# Patient Record
Sex: MALE | Race: WHITE | NOT HISPANIC OR LATINO | Employment: FULL TIME | URBAN - METROPOLITAN AREA
[De-identification: names, ages, dates, MRNs, and addresses within clinical notes are randomized per-mention and may not be internally consistent; named-entity substitution may affect disease eponyms.]

---

## 2017-02-01 ENCOUNTER — TELEPHONE (OUTPATIENT)
Dept: GASTROENTEROLOGY | Facility: CLINIC | Age: 77
End: 2017-02-01

## 2017-02-01 NOTE — TELEPHONE ENCOUNTER
----- Message from Keiry Gardner sent at 2/1/2017  9:16 AM CST -----  Contact: self, [phone]  Patient called in requesting to speak with you regarding his test results. Please advise.

## 2017-04-04 ENCOUNTER — TELEPHONE (OUTPATIENT)
Dept: GASTROENTEROLOGY | Facility: CLINIC | Age: 77
End: 2017-04-04

## 2017-04-04 NOTE — TELEPHONE ENCOUNTER
----- Message from Sally Jameson sent at 4/4/2017  2:31 PM CDT -----  Contact: self  Pt would like to speak with the nurse regarding getting results from his biopsy.  He can be reached at [phone]

## 2017-05-31 RX ORDER — MESALAMINE 375 MG/1
CAPSULE, EXTENDED RELEASE ORAL
Qty: 120 CAPSULE | Refills: 3 | Status: SHIPPED | OUTPATIENT
Start: 2017-05-31 | End: 2017-09-13 | Stop reason: SDUPTHER

## 2017-09-04 DIAGNOSIS — K51.20 ULCERATIVE PROCTITIS WITHOUT COMPLICATION: ICD-10-CM

## 2017-09-06 RX ORDER — AZATHIOPRINE 50 MG/1
TABLET ORAL
Qty: 180 TABLET | Refills: 5 | Status: SHIPPED | OUTPATIENT
Start: 2017-09-06

## 2017-09-14 ENCOUNTER — TELEPHONE (OUTPATIENT)
Dept: GASTROENTEROLOGY | Facility: CLINIC | Age: 77
End: 2017-09-14

## 2017-09-14 RX ORDER — MESALAMINE 375 MG/1
CAPSULE, EXTENDED RELEASE ORAL
Qty: 120 CAPSULE | Refills: 3 | Status: SHIPPED | OUTPATIENT
Start: 2017-09-14

## 2017-09-14 NOTE — TELEPHONE ENCOUNTER
Refilled prescription.  Looks like Dr. Mendoza wanted him to FU at 6 months.      Please call to schedule appt with Dr. Mendoza for follow up

## 2021-01-10 ENCOUNTER — IMMUNIZATION (OUTPATIENT)
Dept: PRIMARY CARE CLINIC | Facility: CLINIC | Age: 81
End: 2021-01-10
Payer: MEDICARE

## 2021-01-10 DIAGNOSIS — Z23 NEED FOR VACCINATION: ICD-10-CM

## 2021-01-10 PROCEDURE — 91300 COVID-19, MRNA, LNP-S, PF, 30 MCG/0.3 ML DOSE VACCINE: CPT | Mod: PBBFAC | Performed by: FAMILY MEDICINE

## 2021-01-31 ENCOUNTER — IMMUNIZATION (OUTPATIENT)
Dept: PRIMARY CARE CLINIC | Facility: CLINIC | Age: 81
End: 2021-01-31
Payer: MEDICARE

## 2021-01-31 DIAGNOSIS — Z23 NEED FOR VACCINATION: Primary | ICD-10-CM

## 2021-01-31 PROCEDURE — 0002A COVID-19, MRNA, LNP-S, PF, 30 MCG/0.3 ML DOSE VACCINE: CPT | Mod: PBBFAC | Performed by: EMERGENCY MEDICINE

## 2021-01-31 PROCEDURE — 91300 COVID-19, MRNA, LNP-S, PF, 30 MCG/0.3 ML DOSE VACCINE: CPT | Mod: PBBFAC | Performed by: EMERGENCY MEDICINE

## 2021-05-14 ENCOUNTER — EXTERNAL HOSPITAL ADMISSION (OUTPATIENT)
Dept: ADMINISTRATIVE | Facility: CLINIC | Age: 81
End: 2021-05-14

## 2021-05-14 ENCOUNTER — PATIENT OUTREACH (OUTPATIENT)
Dept: ADMINISTRATIVE | Facility: CLINIC | Age: 81
End: 2021-05-14

## 2021-07-08 ENCOUNTER — PES CALL (OUTPATIENT)
Dept: ADMINISTRATIVE | Facility: CLINIC | Age: 81
End: 2021-07-08

## 2022-01-18 ENCOUNTER — TELEPHONE (OUTPATIENT)
Dept: DERMATOLOGY | Facility: CLINIC | Age: 82
End: 2022-01-18
Payer: MEDICARE

## 2022-01-18 NOTE — TELEPHONE ENCOUNTER
Spoke with patient and he stated he had a bx done by Dr. Chaudhry and would like to come in for consultation with Dr. Mcbride. I advised him to have his path faxed over to us and we can get him scheduled. Pt expressed verbal understanding and appreciated the call back.

## 2022-01-26 ENCOUNTER — TELEPHONE (OUTPATIENT)
Dept: DERMATOLOGY | Facility: CLINIC | Age: 82
End: 2022-01-26
Payer: MEDICARE

## 2022-01-26 NOTE — TELEPHONE ENCOUNTER
Contacted Mr. Hernandez he is ep and would like same day sx. Rec'd path report from [facility] but was unclear of the area that were treated previously. Contacted [facility] to rec' clarification, nurse stated that she would call back to verify the sites that need Mohs. Scheduled Mr. Hernandez  for SCC l posterior scalp confirmed day 3/9/2022. Will mail appt reminder.

## 2022-02-02 ENCOUNTER — TELEPHONE (OUTPATIENT)
Dept: DERMATOLOGY | Facility: CLINIC | Age: 82
End: 2022-02-02
Payer: MEDICARE

## 2022-02-02 NOTE — TELEPHONE ENCOUNTER
----- Message from Darin Brady sent at 2/2/2022  3:21 PM CST -----  Contact: @[phone]  Patient requesting a return call regarding 3-9th appt, Please return call to discuss further

## 2022-02-02 NOTE — TELEPHONE ENCOUNTER
Spoke with patient and answered all of his questions regarding his mohs procedure scheduled for 3/9. Informed him we did receive the other biopsy on his ear and Dr. Mcbride will decide whether or not she can do both sites same day. Pt expressed verbal understanding.

## 2022-02-12 ENCOUNTER — OFFICE VISIT (OUTPATIENT)
Dept: URGENT CARE | Facility: CLINIC | Age: 82
End: 2022-02-12
Payer: COMMERCIAL

## 2022-02-12 VITALS
HEART RATE: 70 BPM | RESPIRATION RATE: 16 BRPM | OXYGEN SATURATION: 98 % | SYSTOLIC BLOOD PRESSURE: 148 MMHG | WEIGHT: 133 LBS | BODY MASS INDEX: 20.88 KG/M2 | DIASTOLIC BLOOD PRESSURE: 83 MMHG | TEMPERATURE: 98 F | HEIGHT: 67 IN

## 2022-02-12 DIAGNOSIS — L30.9 DERMATITIS: Primary | ICD-10-CM

## 2022-02-12 PROCEDURE — 99214 OFFICE O/P EST MOD 30 MIN: CPT | Mod: S$GLB,,, | Performed by: EMERGENCY MEDICINE

## 2022-02-12 PROCEDURE — 3079F PR MOST RECENT DIASTOLIC BLOOD PRESSURE 80-89 MM HG: ICD-10-PCS | Mod: CPTII,S$GLB,, | Performed by: EMERGENCY MEDICINE

## 2022-02-12 PROCEDURE — 3077F SYST BP >= 140 MM HG: CPT | Mod: CPTII,S$GLB,, | Performed by: EMERGENCY MEDICINE

## 2022-02-12 PROCEDURE — 1159F PR MEDICATION LIST DOCUMENTED IN MEDICAL RECORD: ICD-10-PCS | Mod: CPTII,S$GLB,, | Performed by: EMERGENCY MEDICINE

## 2022-02-12 PROCEDURE — 3079F DIAST BP 80-89 MM HG: CPT | Mod: CPTII,S$GLB,, | Performed by: EMERGENCY MEDICINE

## 2022-02-12 PROCEDURE — 99214 PR OFFICE/OUTPT VISIT, EST, LEVL IV, 30-39 MIN: ICD-10-PCS | Mod: S$GLB,,, | Performed by: EMERGENCY MEDICINE

## 2022-02-12 PROCEDURE — 1159F MED LIST DOCD IN RCRD: CPT | Mod: CPTII,S$GLB,, | Performed by: EMERGENCY MEDICINE

## 2022-02-12 PROCEDURE — 1160F PR REVIEW ALL MEDS BY PRESCRIBER/CLIN PHARMACIST DOCUMENTED: ICD-10-PCS | Mod: CPTII,S$GLB,, | Performed by: EMERGENCY MEDICINE

## 2022-02-12 PROCEDURE — 1160F RVW MEDS BY RX/DR IN RCRD: CPT | Mod: CPTII,S$GLB,, | Performed by: EMERGENCY MEDICINE

## 2022-02-12 PROCEDURE — 3077F PR MOST RECENT SYSTOLIC BLOOD PRESSURE >= 140 MM HG: ICD-10-PCS | Mod: CPTII,S$GLB,, | Performed by: EMERGENCY MEDICINE

## 2022-02-12 RX ORDER — MUPIROCIN 20 MG/G
OINTMENT TOPICAL
Qty: 22 G | Refills: 1 | Status: SHIPPED | OUTPATIENT
Start: 2022-02-12

## 2022-02-12 NOTE — PATIENT INSTRUCTIONS
Patient Education    Dr. Puja Blackmon  Address: [address], [place], [place] [address]  Phone: [phone]    Dr. Radha Mclean  Address: [address], [place] [address]  Phone: [phone]       Dermatitis   The Basics   Written by the doctors and editors at [facility]   What is dermatitis? -- Dermatitis is a type of skin rash that can happen after your skin touches something that irritates it or something you are allergic to.  Things that irritate the skin can be found in products you use every day, such as soaps or cleansers. Some of the things that can cause skin allergies include:  · Certain medicines, perfumes, or cosmetics  · The metal in some kinds of jewelry  · Plants, such as poison ivy and poison oak  Sometimes you can develop a rash the first time you touch something. But it is also possible to get a rash from something you have used before without any problems.  What other symptoms should I watch for? -- If you have a rash, your skin might be dry, itchy, or cracked. In people with light skin, the rash is often red. In people with darker skin, it might appear purple, brown, gray, or black. If your rash is caused by an allergy, you might also have some swelling or blisters where you have the rash.   Severe symptoms include:  · Pain  · Widespread swelling  · Blisters, oozing, or crusting of the skin  What can I do to get rid of my dermatitis? -- You can:  · Avoid using or touching whatever might have caused your rash  · Protect your skin from anything that might irritate it or cause an allergy. For example, wear gloves if you need to work with harsh soaps.  · Try using soothing skin products to help with the itching and discomfort. Things that might help include:  ? Unscented, thick moisturizing cream or petroleum jelly  ? A special kind of bath called an oatmeal bath  Should I see a doctor or nurse? -- See your doctor or nurse if your rash does not go away within 2  weeks, or if it gets worse. Your doctor can help figure out what could be causing your rash.  How are skin rashes treated? -- Your doctor might prescribe different treatments or medicines to help your rash. These can include:  · Steroid creams and ointments - These are not the same as the steroids some athletes take illegally. They go on the skin, and they relieve itching and redness.  · Steroid pills - You might need to take these for a short time if your rash is severe. But your doctor or nurse will want to take you off steroid pills as soon as possible. Even though these medicines help, they can also cause problems of their own.  · Wet or damp dressings - These can be helpful for skin that is crusting or oozing. To use a wet or damp dressing, you will need to wear 2 layers of clothing. First, you put on a layer of damp cotton clothes over your rash. Then, you put on a layer of dry clothes on top of the damp ones. People who need these dressings often wear them at night when they sleep.  All topics are updated as new evidence becomes available and our peer review process is complete.  This topic retrieved from [employer] on: Sep 21, 2021.  Topic 59371 Version 8.0  Release: 29.4.2 - C29.263  © 2021 UpToDate, Inc. and/or its affiliates. All rights reserved.  picture 1: Irritant contact dermatitis     Irritant contact dermatitis usually affects the hands. It causes the skin to turn red and dry, and to chap and crack.  Graphic 11184 Version 4.0    Consumer Information Use and Disclaimer   This information is not specific medical advice and does not replace information you receive from your health care provider. This is only a brief summary of general information. It does NOT include all information about conditions, illnesses, injuries, tests, procedures, treatments, therapies, discharge instructions or life-style choices that may apply to you. You must talk with your health care provider for complete information about  your health and treatment options. This information should not be used to decide whether or not to accept your health care provider's advice, instructions or recommendations. Only your health care provider has the knowledge and training to provide advice that is right for you. The use of this information is governed by the [employer] End User License Agreement, available at https://www.Scalent Systems/en/solutions/Bountii/about/aida.The use of [employer] content is governed by the [employer] Terms of Use. ©2021 [employer]. All rights reserved.  Copyright   © 2021 [employer], Inc. and/or its affiliates. All rights reserved.
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
## 2022-02-14 ENCOUNTER — TELEPHONE (OUTPATIENT)
Dept: DERMATOLOGY | Facility: CLINIC | Age: 82
End: 2022-02-14
Payer: MEDICARE

## 2022-02-14 NOTE — TELEPHONE ENCOUNTER
Spoke with pt, pt states he was able to get an appt with another provider and does not need an appt with us anymore.

## 2022-02-14 NOTE — TELEPHONE ENCOUNTER
----- Message from April NEGRO Delgado sent at 2/14/2022  4:02 PM CST -----  Regarding: FW: appt  Contact: Pt    ----- Message -----  From: Uyen Antonio, Patient Care Assistant  Sent: 2/14/2022   8:38 AM CST  To: Lorri CRUZ Staff  Subject: appt                                             Pt is requesting a call back in regards to getting an appt for today. Pt states referring physician wants pt to be seen today if any availability. Pt has referral and would like to see this physician.        Pt @ [phone]

## 2022-03-09 ENCOUNTER — PROCEDURE VISIT (OUTPATIENT)
Dept: DERMATOLOGY | Facility: CLINIC | Age: 82
End: 2022-03-09
Payer: COMMERCIAL

## 2022-03-09 VITALS
HEIGHT: 67 IN | WEIGHT: 133 LBS | BODY MASS INDEX: 20.88 KG/M2 | HEART RATE: 65 BPM | DIASTOLIC BLOOD PRESSURE: 90 MMHG | SYSTOLIC BLOOD PRESSURE: 159 MMHG

## 2022-03-09 DIAGNOSIS — C44.42 SQUAMOUS CELL CARCINOMA, SCALP/NECK: Primary | ICD-10-CM

## 2022-03-09 PROCEDURE — 99499 NO LOS: ICD-10-PCS | Mod: S$GLB,,, | Performed by: DERMATOLOGY

## 2022-03-09 PROCEDURE — 13121 CMPLX RPR S/A/L 2.6-7.5 CM: CPT | Mod: S$GLB,,, | Performed by: DERMATOLOGY

## 2022-03-09 PROCEDURE — 99499 UNLISTED E&M SERVICE: CPT | Mod: S$GLB,,, | Performed by: DERMATOLOGY

## 2022-03-09 PROCEDURE — 13121 PR RECMPL WND SCALP,EXTR 2.6-7.5 CM: ICD-10-PCS | Mod: S$GLB,,, | Performed by: DERMATOLOGY

## 2022-03-09 PROCEDURE — 17311 MOHS 1 STAGE H/N/HF/G: CPT | Mod: 51,S$GLB,, | Performed by: DERMATOLOGY

## 2022-03-09 PROCEDURE — 17311: ICD-10-PCS | Mod: 51,S$GLB,, | Performed by: DERMATOLOGY

## 2022-03-09 NOTE — LETTER
March 9, 2022      [facility] 1st Fl  [address]  [place] [address]  Phone: [phone]  Fax: [phone]       Patient: Piter Hernandez   YOB: 1940  Date of Visit: 03/09/2022    To Whom It May Concern:      [facility] [employer] [facility] 03/09/2022. He may return to work on 3/14/2022 without restrictions. If you have any questions or concerns, or if I can be of further assistance, please do not hesitate to contact me.    Sincerely,        [facility] Froil[facility]

## 2022-03-09 NOTE — PROGRESS NOTES
Physical Exam   HENT:   Head:         L posterior scalp with 5 x 7 mm pink bx site located 10 mm superiorly from the left superior ear attachment and 3 cm posteriorly.    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  Diagnosis, photograph, and pathology report were reviewed with patient.  Discussed risks, benefits, and alternatives of Mohs surgery.  Discussed repair options including complex closure, skin flap, skin graft, and second intention healing.  Patient elected to proceed with Mohs surgery today.    Of note, pt had other areas of concern on anterior scalp biopsied by Hilary Luna NP.  According to notes, biopsy anterior scalp done by Jeremy 10/20/21.  Then patient went back to [facility] 1/6/22 at which time the anterior scalp SCC was excised and also another area medial scalp was excised.  Path from both of these revealed AK and sebaceous hyperplasia without evidence of carcinoma. The only lesion still left to treat then is the left posterior scalp for which the patient presents here today.     Clinically today, evidence of excision anterior scalp with well-healed scar and no evidence of recurrence of carcinoma.  Only scaly erythematous plaques over anterior and medial scalp c/w actinic keratoses.  Disc this with patient and rec f/u with general derm to addresss AKs.       PROCEDURE: Mohs' Micrographic Surgery    INDICATION: Biopsy-proven skin cancer of cosmetically and functionally important areas, including head, neck, genital, hand, foot, or areas known for having difficulty in healing, such as the lower anterior legs. Tumor with ill-defined borders.    REFERRING PROVIDER: Santiago Chaudhry M.D. (Lesion biopsied by Hilary Luna NP at [facility])    CASE NUMBER:     ANESTHETIC: 5 cc 0.5% Lidocaine with Epi 1:200,000 mixed 1:1 with 0.5% Bupivacaine    SURGICAL PREP: Hibiclens    SURGEON: Martin Mcbride MD    ASSISTANTS: Angi Reynolds, Surg  Tech    PREOPERATIVE DIAGNOSIS: squamous cell carcinoma- well differentiated    POSTOPERATIVE DIAGNOSIS: squamous cell carcinoma    PATHOLOGIC DIAGNOSIS: squamous cell carcinoma- well differentiated    HISTOLOGY OF SPECIMENS IN FIRST STAGE:   Tumor Type: No tumor seen.    STAGES OF MOHS' SURGERY PERFORMED: 1    TUMOR-FREE PLANE ACHIEVED: Yes    HEMOSTASIS: electrocoagulation     SPECIMENS: 2    LOCATION: left posterior scalp. Location verified with Dr. Chaudhry's clinical photograph as well as Ms. Luna's clinical photograph. Patient also verified location by looking at photo taken prior to procedure.     INITIAL LESION SIZE: 0.5 x 0.7 cm    FINAL DEFECT SIZE: 1.0 x 1.0 cm    WOUND REPAIR/DISPOSITION: The patient tolerated Mohs' Micrographic Surgery for a squamous cell carcinoma very well. When the tumor was completely removed, a repair of the surgical defect was undertaken.       PROCEDURE: Complex Linear Repair    INDICATION: Status post Mohs' Micrographic Surgery for squamous cell carcinoma.    CASE NUMBER:     SURGEON: Martin Mcbride MD    ASSISTANTS: Diane Sepulveda PA-C and Angi Reynolds Surg Jewels    ANESTHETIC: 2.5 cc 0.5% Lidocaine with Epi 1:200,000     SURGICAL PREP: Hibiclens, prepped by Angi Reynolds Surg Tech    LOCATION: left posterior scalp    DEFECT SIZE: 1.0 x 1.0 cm    WOUND REPAIR/DISPOSITION:  After the patient's carcinoma had been completely removed with Mohs' Micrographic Surgery, a repair of the surgical defect was undertaken. The patient was returned to the operating suite where the area of left posterior scalp was prepped, draped, and anesthetized in the usual sterile fashion. The wound was widely undermined in all directions. The wound was undermined to a distance at least the maximum width of the defect as measured perpendicular to the closure line along at least one entire edge of the defect, in this case 1 cm. Then, electrocoagulation was used to obtain meticulous hemostasis. 3-0  "Vicryl buried vertical mattress sutures were placed into the subcutaneous and dermal plane to close the wound and diana the cutaneous wound edge. Bilateral dog ears were identified and were removed by a standard Burow's triangle technique. The cutaneous wound edges were closed using interrupted 3-0 Prolene suture.    The patient tolerated the procedure well.    The area was cleaned and dressed appropriately and the patient was given wound care instructions, as well as appointment for follow-up evaluation and suture removal in 14 days.    LENGTH OF REPAIR: 2.6 cm    Vitals:    03/09/22 0722 03/09/22 0918   BP: (!) 147/75 (!) 159/90   BP Location: Left arm Left arm   Patient Position: Sitting Sitting   BP Method: Small (Automatic) Small (Automatic)   Pulse: 64 65   Weight: 60.3 kg (133 lb)    Height: 5' 7" (1.702 m)        "

## 2022-03-23 ENCOUNTER — OFFICE VISIT (OUTPATIENT)
Dept: DERMATOLOGY | Facility: CLINIC | Age: 82
End: 2022-03-23
Payer: MEDICARE

## 2022-03-23 DIAGNOSIS — Z09 POSTOP CHECK: Primary | ICD-10-CM

## 2022-03-23 PROCEDURE — 3288F FALL RISK ASSESSMENT DOCD: CPT | Mod: CPTII,S$GLB,, | Performed by: DERMATOLOGY

## 2022-03-23 PROCEDURE — 99024 POSTOP FOLLOW-UP VISIT: CPT | Mod: S$GLB,,, | Performed by: DERMATOLOGY

## 2022-03-23 PROCEDURE — 1101F PR PT FALLS ASSESS DOC 0-1 FALLS W/OUT INJ PAST YR: ICD-10-PCS | Mod: CPTII,S$GLB,, | Performed by: DERMATOLOGY

## 2022-03-23 PROCEDURE — 1160F RVW MEDS BY RX/DR IN RCRD: CPT | Mod: CPTII,S$GLB,, | Performed by: DERMATOLOGY

## 2022-03-23 PROCEDURE — 1159F PR MEDICATION LIST DOCUMENTED IN MEDICAL RECORD: ICD-10-PCS | Mod: CPTII,S$GLB,, | Performed by: DERMATOLOGY

## 2022-03-23 PROCEDURE — 99999 PR PBB SHADOW E&M-EST. PATIENT-LVL II: CPT | Mod: PBBFAC,,, | Performed by: DERMATOLOGY

## 2022-03-23 PROCEDURE — 1101F PT FALLS ASSESS-DOCD LE1/YR: CPT | Mod: CPTII,S$GLB,, | Performed by: DERMATOLOGY

## 2022-03-23 PROCEDURE — 99999 PR PBB SHADOW E&M-EST. PATIENT-LVL II: ICD-10-PCS | Mod: PBBFAC,,, | Performed by: DERMATOLOGY

## 2022-03-23 PROCEDURE — 99024 PR POST-OP FOLLOW-UP VISIT: ICD-10-PCS | Mod: S$GLB,,, | Performed by: DERMATOLOGY

## 2022-03-23 PROCEDURE — 1126F AMNT PAIN NOTED NONE PRSNT: CPT | Mod: CPTII,S$GLB,, | Performed by: DERMATOLOGY

## 2022-03-23 PROCEDURE — 1126F PR PAIN SEVERITY QUANTIFIED, NO PAIN PRESENT: ICD-10-PCS | Mod: CPTII,S$GLB,, | Performed by: DERMATOLOGY

## 2022-03-23 PROCEDURE — 1160F PR REVIEW ALL MEDS BY PRESCRIBER/CLIN PHARMACIST DOCUMENTED: ICD-10-PCS | Mod: CPTII,S$GLB,, | Performed by: DERMATOLOGY

## 2022-03-23 PROCEDURE — 3288F PR FALLS RISK ASSESSMENT DOCUMENTED: ICD-10-PCS | Mod: CPTII,S$GLB,, | Performed by: DERMATOLOGY

## 2022-03-23 PROCEDURE — 1159F MED LIST DOCD IN RCRD: CPT | Mod: CPTII,S$GLB,, | Performed by: DERMATOLOGY

## 2022-03-23 NOTE — PROGRESS NOTES
81 y.o. male patient is here for suture removal following Mohs' surgery.    Patient reports no problems left posterior scalp.    WOUND PE:  The left posterior scalp sutures intact. Wound healing well. Good skin edges. No signs or symptoms of infection.      IMPRESSION:  Healing operative site from Mohs' surgery SCC, left posterior scalp s/p with Mohs with CLC, postop day # 14.    PLAN:  Sutures removed today by Treva Pino MA. Steri-strips applied.  Continue wound care.  Keep moist with Aquaphor.    Of note, patient had a fall, affected right upper forehead but this surgical site intact.  Call if any issues arise    RTC:  In 3-6 months with Santiago Chaudhry M.D. for skin check or sooner if new concern arises.

## 2022-06-14 ENCOUNTER — TELEPHONE (OUTPATIENT)
Dept: DERMATOLOGY | Facility: CLINIC | Age: 82
End: 2022-06-14
Payer: MEDICARE

## 2022-06-14 NOTE — TELEPHONE ENCOUNTER
Ep was informed with bx proven SCC right mid preauricular cheek. Pt was scheduled for Mohs sx on 6/22/2022 at 10:00. Pt verbally confirmed appt date/time and information given to him.

## 2022-06-22 ENCOUNTER — PROCEDURE VISIT (OUTPATIENT)
Dept: DERMATOLOGY | Facility: CLINIC | Age: 82
End: 2022-06-22
Payer: COMMERCIAL

## 2022-06-22 VITALS
HEART RATE: 68 BPM | BODY MASS INDEX: 20.88 KG/M2 | WEIGHT: 133 LBS | HEIGHT: 67 IN | SYSTOLIC BLOOD PRESSURE: 138 MMHG | DIASTOLIC BLOOD PRESSURE: 82 MMHG

## 2022-06-22 DIAGNOSIS — C44.329 SQUAMOUS CELL CARCINOMA OF SKIN OF RIGHT CHEEK: Primary | ICD-10-CM

## 2022-06-22 PROCEDURE — 17312: ICD-10-PCS | Mod: S$GLB,,, | Performed by: DERMATOLOGY

## 2022-06-22 PROCEDURE — 99499 NO LOS: ICD-10-PCS | Mod: S$GLB,,, | Performed by: DERMATOLOGY

## 2022-06-22 PROCEDURE — 17311 MOHS 1 STAGE H/N/HF/G: CPT | Mod: 51,S$GLB,, | Performed by: DERMATOLOGY

## 2022-06-22 PROCEDURE — 13132 PR RECMPL WND HEAD,FAC,HAND 2.6-7.5 CM: ICD-10-PCS | Mod: S$GLB,,, | Performed by: DERMATOLOGY

## 2022-06-22 PROCEDURE — 17311: ICD-10-PCS | Mod: 51,S$GLB,, | Performed by: DERMATOLOGY

## 2022-06-22 PROCEDURE — 13132 CMPLX RPR F/C/C/M/N/AX/G/H/F: CPT | Mod: S$GLB,,, | Performed by: DERMATOLOGY

## 2022-06-22 PROCEDURE — 17312 MOHS ADDL STAGE: CPT | Mod: S$GLB,,, | Performed by: DERMATOLOGY

## 2022-06-22 PROCEDURE — 99499 UNLISTED E&M SERVICE: CPT | Mod: S$GLB,,, | Performed by: DERMATOLOGY

## 2022-06-22 NOTE — PROGRESS NOTES
PROCEDURE: Mohs' Micrographic Surgery    INDICATION: Location in mask areas of face including central face, nose, eyelids, eyebrows, lips, chin, preauricular, temple, and ear. Biopsy-proven skin cancer of cosmetically and functionally important areas, including head, neck, genital, hand, foot, or areas known for having difficulty in healing, such as the lower anterior legs. Tumor with ill-defined borders. Tumor with aggressive histopathology. Aggressive histopathology including sclerosing, morpheaform/infiltrating, micronodular, superficial multicentric, poorly differentiated, basosquamous, or perineural invasion.    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  Tumor Type: Tumor seen. Invasive squamous cell carcinoma: Proliferation of squamous cells exhibiting atypia and infiltrating within the dermis.  Moderately-differentiated squamous cell carcinoma: Proliferation of squamous cells exhibiting atypia of moderate differentiation and infiltrating within the dermis.   Depth of Invasion: epidermis and dermis  Perineural Invasion: No    HISTOLOGY OF SPECIMENS IN SUBSEQUENT STAGES:  · Tumor Type: No tumor seen.    STAGES OF MOHS' SURGERY PERFORMED: 2    TUMOR-FREE PLANE ACHIEVED: Yes    HEMOSTASIS: electrocoagulation     SPECIMENS: 3 (2 in stage A and 1 in stage B)    LOCATION: right mid  preauricular (inferior). Location verified with Dr. Miller's clinical photograph. Patient also verified location with hand held mirror.    INITIAL LESION SIZE: 0.7 x 0.9 cm    FINAL DEFECT SIZE: 1.1 x 1.4 cm    WOUND REPAIR/DISPOSITION: Palpation of bilateral preauricular, postauricular, anterior cervical, posterior cervical, sublingual, submental and occipital lymph nodes did not find these to be enlarged. The patient tolerated Mohs' Micrographic Surgery for a squamous cell carcinoma very well. When the tumor was completely removed, a repair of the surgical defect was undertaken.       PROCEDURE: Complex Linear Repair    INDICATION: Status post Mohs' Micrographic Surgery for squamous cell carcinoma.    CASE NUMBER:     SURGEON: Martin Mcbride MD    ASSISTANTS: Diane Sepulveda PA-C, Treva Pino MA and Angi Reynolds Surg Tech    ANESTHETIC: 3 cc 1% Lidocaine with Epinephrine 1:100,000    SURGICAL PREP: Hibiclens, prepped by Treva Pino MA    LOCATION: right mid preauricular (inferior)    DEFECT SIZE: 1.1 x 1.4 cm    WOUND REPAIR/DISPOSITION:  After the patient's carcinoma had been completely removed with Mohs' Micrographic Surgery, a repair of the surgical defect was undertaken. The patient was returned to the operating suite where the area of right inferior preauricular was prepped, draped, and anesthetized in the usual sterile fashion. The wound was widely undermined in all directions. The wound was undermined to a distance at least the maximum width of the defect as measured perpendicular to the closure line along at least one entire edge of the defect, in this case 2 cm. Then, electrocoagulation was used to obtain meticulous hemostasis. 5-0 Vicryl buried vertical mattress sutures were placed into the subcutaneous and dermal plane to close the wound and diana the cutaneous wound edge. Bilateral dog ears were identified and were removed by a standard Burow's triangle technique. The cutaneous  "wound edges were closed using interrupted 5-0 Prolene suture.    The patient tolerated the procedure well.    The area was cleaned and dressed appropriately and the patient was given wound care instructions, as well as appointment for follow-up evaluation and suture removal in 7 days.    LENGTH OF REPAIR: 3.3 cm    Vitals:    06/22/22 0942 06/22/22 1313   BP: (!) 151/91 138/82   BP Location: Left arm    Patient Position: Sitting    BP Method: Small (Automatic)    Pulse: 63 68   Weight: 60.3 kg (133 lb)    Height: 5' 7" (1.702 m)        NOTE: pt expressed concerns that his cardiologist felt a "bump" in the right ear/neck area at his last visit. Upon examination today, no such bumps or growths were appreciated by palpation today. No lymphadenopathy appreciated either. Advised pt to let us know if he felt any bumps develop in this area in the future.   "

## 2022-06-29 ENCOUNTER — OFFICE VISIT (OUTPATIENT)
Dept: DERMATOLOGY | Facility: CLINIC | Age: 82
End: 2022-06-29
Payer: MEDICARE

## 2022-06-29 DIAGNOSIS — Z09 POSTOP CHECK: Primary | ICD-10-CM

## 2022-06-29 PROCEDURE — 99024 PR POST-OP FOLLOW-UP VISIT: ICD-10-PCS | Mod: S$GLB,,, | Performed by: DERMATOLOGY

## 2022-06-29 PROCEDURE — 1101F PR PT FALLS ASSESS DOC 0-1 FALLS W/OUT INJ PAST YR: ICD-10-PCS | Mod: CPTII,S$GLB,, | Performed by: DERMATOLOGY

## 2022-06-29 PROCEDURE — 3288F FALL RISK ASSESSMENT DOCD: CPT | Mod: CPTII,S$GLB,, | Performed by: DERMATOLOGY

## 2022-06-29 PROCEDURE — 3288F PR FALLS RISK ASSESSMENT DOCUMENTED: ICD-10-PCS | Mod: CPTII,S$GLB,, | Performed by: DERMATOLOGY

## 2022-06-29 PROCEDURE — 1126F PR PAIN SEVERITY QUANTIFIED, NO PAIN PRESENT: ICD-10-PCS | Mod: CPTII,S$GLB,, | Performed by: DERMATOLOGY

## 2022-06-29 PROCEDURE — 99024 POSTOP FOLLOW-UP VISIT: CPT | Mod: S$GLB,,, | Performed by: DERMATOLOGY

## 2022-06-29 PROCEDURE — 1126F AMNT PAIN NOTED NONE PRSNT: CPT | Mod: CPTII,S$GLB,, | Performed by: DERMATOLOGY

## 2022-06-29 PROCEDURE — 99999 PR PBB SHADOW E&M-EST. PATIENT-LVL I: ICD-10-PCS | Mod: PBBFAC,,, | Performed by: DERMATOLOGY

## 2022-06-29 PROCEDURE — 1101F PT FALLS ASSESS-DOCD LE1/YR: CPT | Mod: CPTII,S$GLB,, | Performed by: DERMATOLOGY

## 2022-06-29 PROCEDURE — 99999 PR PBB SHADOW E&M-EST. PATIENT-LVL I: CPT | Mod: PBBFAC,,, | Performed by: DERMATOLOGY

## 2022-06-29 NOTE — PROGRESS NOTES
81 y.o. male patient is here for suture removal following Mohs' surgery.    Patient reports no problems right mid preauricular (inferior).    WOUND PE:  The right mid preauricular (inferior) sutures intact. Wound healing well. Good skin edges. No signs or symptoms of infection.    IMPRESSION:  Healing operative site from Mohs' surgery SCC, right mid preauricular (inferior) s/p Mohs with CLC, postop day # 7.    PLAN:  Sutures removed today by Angi Reynolds, Surg Tech. Steri-strips applied.  Continue wound care.  Keep moist with Aquaphor.  Call if any concerns arise    RTC:  In 3-6 months with Bernie Miller MD for skin check or sooner if new concern arises.

## 2022-08-22 ENCOUNTER — TELEPHONE (OUTPATIENT)
Dept: DERMATOLOGY | Facility: CLINIC | Age: 82
End: 2022-08-22
Payer: MEDICARE

## 2022-08-22 NOTE — TELEPHONE ENCOUNTER
Called pt and asked him to send some photos of the area before scheduling for Mohs surgery. Pt said he would send them through email.

## 2022-08-26 ENCOUNTER — TELEPHONE (OUTPATIENT)
Dept: DERMATOLOGY | Facility: CLINIC | Age: 82
End: 2022-08-26
Payer: MEDICARE

## 2022-08-26 NOTE — TELEPHONE ENCOUNTER
Called pt and asked him to email photos of L finger joint site for Mohs. Pt stated he will send photos and then we will call back for scheduling.

## 2022-08-26 NOTE — TELEPHONE ENCOUNTER
"----- Message from Edel Flores sent at 8/26/2022  2:32 PM CDT -----  Consult/Advisory:           Name Of Caller: self    Contact Preference?: [phone]    What is the nature of the call?: requesting a call back           Additional Notes:  "Thank you for all that you do for our patients'"     "

## 2022-08-29 ENCOUNTER — TELEPHONE (OUTPATIENT)
Dept: DERMATOLOGY | Facility: CLINIC | Age: 82
End: 2022-08-29
Payer: MEDICARE

## 2022-08-29 NOTE — TELEPHONE ENCOUNTER
"----- Message from Edel Flores sent at 8/29/2022  2:49 PM CDT -----  Consult/Advisory:           Name Of Caller: self    Contact Preference?: [phone]    What is the nature of the call?: pt is unable to upload pictures and states is able to come in tomorrow           Additional Notes:  "Thank you for all that you do for our patients'"     "

## 2022-08-29 NOTE — TELEPHONE ENCOUNTER
Pt call back; pt coming in tomorrow morning around 9:30 for us to take a photo of L dorsal index metacarpophalangeal joint.

## 2022-08-30 ENCOUNTER — TELEPHONE (OUTPATIENT)
Dept: DERMATOLOGY | Facility: CLINIC | Age: 82
End: 2022-08-30
Payer: MEDICARE

## 2022-08-30 NOTE — TELEPHONE ENCOUNTER
Called pt to schedule for SCC L dorsal index metacarpophalangeal joint. Scheduled for 10/19 at 12:30pm. Pt confirmed date, time, and location. Pt appt information sent in the mail.

## 2022-10-18 ENCOUNTER — TELEPHONE (OUTPATIENT)
Dept: DERMATOLOGY | Facility: CLINIC | Age: 82
End: 2022-10-18
Payer: MEDICARE

## 2022-10-18 NOTE — TELEPHONE ENCOUNTER
Pt cancelled 10/19 12:30 Mohs for SCC L index joint because of recent heart surgery and complications. Pt rescheduled to 11/29 at 11:30am. Pt confirmed new date and time. Appt reminder sent in the mail.

## 2022-11-18 ENCOUNTER — OFFICE VISIT (OUTPATIENT)
Dept: URGENT CARE | Facility: CLINIC | Age: 82
End: 2022-11-18
Payer: COMMERCIAL

## 2022-11-18 VITALS
DIASTOLIC BLOOD PRESSURE: 84 MMHG | SYSTOLIC BLOOD PRESSURE: 138 MMHG | WEIGHT: 133 LBS | HEIGHT: 67 IN | RESPIRATION RATE: 16 BRPM | HEART RATE: 76 BPM | OXYGEN SATURATION: 96 % | BODY MASS INDEX: 20.88 KG/M2 | TEMPERATURE: 98 F

## 2022-11-18 DIAGNOSIS — I48.91 ATRIAL FIBRILLATION, UNSPECIFIED TYPE: ICD-10-CM

## 2022-11-18 DIAGNOSIS — Z87.01 HISTORY OF PNEUMONIA: ICD-10-CM

## 2022-11-18 DIAGNOSIS — R53.83 FATIGUE, UNSPECIFIED TYPE: Primary | ICD-10-CM

## 2022-11-18 DIAGNOSIS — J90 PLEURAL EFFUSION: ICD-10-CM

## 2022-11-18 LAB
CTP QC/QA: YES
SARS-COV-2 AG RESP QL IA.RAPID: NEGATIVE

## 2022-11-18 PROCEDURE — 3075F SYST BP GE 130 - 139MM HG: CPT | Mod: CPTII,S$GLB,, | Performed by: NURSE PRACTITIONER

## 2022-11-18 PROCEDURE — 1160F PR REVIEW ALL MEDS BY PRESCRIBER/CLIN PHARMACIST DOCUMENTED: ICD-10-PCS | Mod: CPTII,S$GLB,, | Performed by: NURSE PRACTITIONER

## 2022-11-18 PROCEDURE — 93005 EKG 12-LEAD: ICD-10-PCS | Mod: S$GLB,,, | Performed by: NURSE PRACTITIONER

## 2022-11-18 PROCEDURE — 93010 EKG 12-LEAD: ICD-10-PCS | Mod: S$GLB,,, | Performed by: INTERNAL MEDICINE

## 2022-11-18 PROCEDURE — 1159F MED LIST DOCD IN RCRD: CPT | Mod: CPTII,S$GLB,, | Performed by: NURSE PRACTITIONER

## 2022-11-18 PROCEDURE — 99215 OFFICE O/P EST HI 40 MIN: CPT | Mod: S$GLB,,, | Performed by: NURSE PRACTITIONER

## 2022-11-18 PROCEDURE — 87811 SARS-COV-2 COVID19 W/OPTIC: CPT | Mod: QW,S$GLB,, | Performed by: NURSE PRACTITIONER

## 2022-11-18 PROCEDURE — 3079F DIAST BP 80-89 MM HG: CPT | Mod: CPTII,S$GLB,, | Performed by: NURSE PRACTITIONER

## 2022-11-18 PROCEDURE — 93005 ELECTROCARDIOGRAM TRACING: CPT | Mod: S$GLB,,, | Performed by: NURSE PRACTITIONER

## 2022-11-18 PROCEDURE — 3079F PR MOST RECENT DIASTOLIC BLOOD PRESSURE 80-89 MM HG: ICD-10-PCS | Mod: CPTII,S$GLB,, | Performed by: NURSE PRACTITIONER

## 2022-11-18 PROCEDURE — 99215 PR OFFICE/OUTPT VISIT, EST, LEVL V, 40-54 MIN: ICD-10-PCS | Mod: S$GLB,,, | Performed by: NURSE PRACTITIONER

## 2022-11-18 PROCEDURE — 1126F PR PAIN SEVERITY QUANTIFIED, NO PAIN PRESENT: ICD-10-PCS | Mod: CPTII,S$GLB,, | Performed by: NURSE PRACTITIONER

## 2022-11-18 PROCEDURE — 87811 SARS CORONAVIRUS 2 ANTIGEN POCT, MANUAL READ: ICD-10-PCS | Mod: QW,S$GLB,, | Performed by: NURSE PRACTITIONER

## 2022-11-18 PROCEDURE — 1159F PR MEDICATION LIST DOCUMENTED IN MEDICAL RECORD: ICD-10-PCS | Mod: CPTII,S$GLB,, | Performed by: NURSE PRACTITIONER

## 2022-11-18 PROCEDURE — 71046 X-RAY EXAM CHEST 2 VIEWS: CPT | Mod: FY,S$GLB,, | Performed by: RADIOLOGY

## 2022-11-18 PROCEDURE — 3075F PR MOST RECENT SYSTOLIC BLOOD PRESS GE 130-139MM HG: ICD-10-PCS | Mod: CPTII,S$GLB,, | Performed by: NURSE PRACTITIONER

## 2022-11-18 PROCEDURE — 1160F RVW MEDS BY RX/DR IN RCRD: CPT | Mod: CPTII,S$GLB,, | Performed by: NURSE PRACTITIONER

## 2022-11-18 PROCEDURE — 1126F AMNT PAIN NOTED NONE PRSNT: CPT | Mod: CPTII,S$GLB,, | Performed by: NURSE PRACTITIONER

## 2022-11-18 PROCEDURE — 93010 ELECTROCARDIOGRAM REPORT: CPT | Mod: S$GLB,,, | Performed by: INTERNAL MEDICINE

## 2022-11-18 PROCEDURE — 71046 XR CHEST PA AND LATERAL: ICD-10-PCS | Mod: FY,S$GLB,, | Performed by: RADIOLOGY

## 2022-11-18 NOTE — PROGRESS NOTES
"Subjective:       Patient ID: Piter Hernandez is a 82 y.o. male.    Vitals:  height is 5' 7" (1.702 m) and weight is 60.3 kg (133 lb). His temperature is 97.8 °F (36.6 °C). His blood pressure is 138/84 and his pulse is 76. His respiration is 16 and oxygen saturation is 96%.     Chief Complaint: Cough    83 y/o male presents to  today with c/o fatigue, not feeling his normal self, cough, and increase in wheeze. Pt reports that he was hospitalized for carotid artery surgery approximately 4 weeks ago, and when hospitalized-developed pneumonia. He completed a round of antibiotics at the time, and during a follow up visit (with ongoing symptoms-approx 2 weeks later), he was again treated once again with antibiotics for a sinus infection. He reports that over the last 2-3 weeks, his symptoms have worsened. A previous CXR suggested COPD, however CXR today shows small pleural effusions and basilar atelectasis, with the inability to exclude pulmonary edema and/or congestive heart failure. Pt reports the wheezing has worsened this afternoon. He reports that he just does note feel well, overall. Denies known fever. Denies chest pain. Has h/o atrial fibrillation.     Cough  This is a new problem. The current episode started 1 to 4 weeks ago. The problem has been unchanged. The problem occurs constantly. The cough is Non-productive. Associated symptoms include headaches, nasal congestion, postnasal drip, rhinorrhea, shortness of breath and wheezing. Pertinent negatives include no fever. The symptoms are aggravated by lying down. Treatments tried: amoxicillin augmentin. The treatment provided mild relief.     Constitution: Positive for fatigue. Negative for fever.   HENT:  Positive for postnasal drip.    Respiratory:  Positive for cough, shortness of breath and wheezing.    Neurological:  Positive for headaches. Negative for disorientation and altered mental status.   Psychiatric/Behavioral:  Negative for altered " mental status and disorientation.      Objective:      Physical Exam   Constitutional: He is oriented to person, place, and time. He appears well-developed. He is cooperative.  Non-toxic appearance. He does not appear ill. No distress.      Comments:Pleasant and answering questions well. Good historian. No apparent distress and appears stable, overall.      HENT:   Head: Normocephalic.   Ears:   Right Ear: Hearing and external ear normal.   Left Ear: Hearing and external ear normal.   Nose: Nose normal. No mucosal edema, rhinorrhea or nasal deformity. No epistaxis. Right sinus exhibits no maxillary sinus tenderness and no frontal sinus tenderness. Left sinus exhibits no maxillary sinus tenderness and no frontal sinus tenderness.   Mouth/Throat: Uvula is midline, oropharynx is clear and moist and mucous membranes are normal. Mucous membranes are moist. No trismus in the jaw. Normal dentition. No uvula swelling. No oropharyngeal exudate, posterior oropharyngeal edema or posterior oropharyngeal erythema. Oropharynx is clear.   Eyes: Lids are normal. Right eye exhibits no discharge. Left eye exhibits no discharge. No scleral icterus.   Neck: Trachea normal and phonation normal. Neck supple. No edema present. No erythema present. No neck rigidity present.   Cardiovascular: Normal rate, normal heart sounds and normal pulses. An irregular rhythm present.   Pulmonary/Chest: Effort normal and breath sounds normal. No respiratory distress. He has no decreased breath sounds. He has no wheezes. He has no rhonchi.         Comments: Scattered course BS    Abdominal: Normal appearance. He exhibits no distension. Soft. flat abdomen There is no abdominal tenderness.   Musculoskeletal: Normal range of motion.         General: No deformity. Normal range of motion.      Right lower leg: No edema.      Left lower leg: No edema.   Neurological: He is alert and oriented to person, place, and time. He exhibits normal muscle tone.  Coordination normal.   Skin: Skin is warm, dry, intact, not diaphoretic and not pale.   Psychiatric: His speech is normal and behavior is normal. Judgment and thought content normal.   Nursing note and vitals reviewed.      Results for orders placed or performed in visit on 11/18/22   SARS Coronavirus 2 Antigen, POCT Manual Read   Result Value Ref Range    SARS Coronavirus 2 Antigen Negative Negative     Acceptable Yes       EKG: atrial Fib    XR CHEST PA AND LATERAL    Result Date: 11/18/2022  EXAMINATION: XR CHEST PA AND LATERAL CLINICAL HISTORY: Cough, unspecified TECHNIQUE: PA and lateral views of the chest were performed. COMPARISON: 11/03/2012 FINDINGS: There is prominence of the bilateral pulmonary interstitial markings with small bilateral pleural effusions and basilar atelectasis.  No pneumothorax.  Cardiac silhouette is upper normal in size.  There is extensive aortic atherosclerosis.  Bones show no acute abnormalities.     Prominence of the bilateral pulmonary interstitial markings with small pleural effusions and basilar atelectasis.  Recommend correlation for pulmonary edema/CHF. This report was flagged in Epic as abnormal. Electronically signed by: Asher Kirkpatrick MD Date:    11/18/2022 Time:    13:02    Assessment:       1. Fatigue, unspecified type    2. Pleural effusion    3. History of pneumonia    4. Atrial fibrillation, unspecified type            Plan:         Fatigue, unspecified type  -     SARS Coronavirus 2 Antigen, POCT Manual Read  -     XR CHEST PA AND LATERAL; Future; Expected date: 11/18/2022  -     IN OFFICE EKG 12-LEAD (to Muse)    Pleural effusion  -     SARS Coronavirus 2 Antigen, POCT Manual Read  -     XR CHEST PA AND LATERAL; Future; Expected date: 11/18/2022  -     Ambulatory referral/consult to Pulmonology    History of pneumonia  -     Ambulatory referral/consult to Pulmonology    Atrial fibrillation, unspecified type  -     XR CHEST PA AND LATERAL; Future;  Expected date: 11/18/2022  -     IN OFFICE EKG 12-LEAD (to Muse)       Patient Instructions   Seek ER at this time for further cardiac care; and further pulmonary care      Medical Decision Making:   Initial Assessment:   Atrial fibrillation; pulmonary effusion; abnormal CXR; fatigue  Differential Diagnosis:   MI, pneumonia   Clinical Tests:   Radiological Study: Ordered and Reviewed  Medical Tests: Ordered and Reviewed  Urgent Care Management:  Pt requires further workup at this time-more than what we can provide in an urgent care setting, including a further cardiac work-up, and further pulmonary care for effusion and to r/o pulmonary edema. And to r/o new onset CHF.  Other:   I have discussed this case with another health care provider.       <> Summary of the Discussion: Dr. Pruitt aware and agrees with plan

## 2022-11-28 ENCOUNTER — TELEPHONE (OUTPATIENT)
Dept: DERMATOLOGY | Facility: CLINIC | Age: 82
End: 2022-11-28
Payer: MEDICARE

## 2022-11-28 NOTE — TELEPHONE ENCOUNTER
Called pt to confirm 11/29 11:30 Mohs for SCC L dorsal index metacarpophalangeal joint; pt still having heart problems and would like to cancel. Pt has new derm (Antonio at [facility]) and would like to consult with him about options. Pt will call us back when he is feeling better to reschedule.

## 2022-11-29 ENCOUNTER — TELEPHONE (OUTPATIENT)
Dept: INFECTIOUS DISEASES | Facility: CLINIC | Age: 82
End: 2022-11-29
Payer: MEDICARE

## 2022-11-29 ENCOUNTER — OFFICE VISIT (OUTPATIENT)
Dept: INFECTIOUS DISEASES | Facility: CLINIC | Age: 82
End: 2022-11-29
Payer: COMMERCIAL

## 2022-11-29 ENCOUNTER — HOSPITAL ENCOUNTER (OUTPATIENT)
Dept: RADIOLOGY | Facility: HOSPITAL | Age: 82
Discharge: HOME | End: 2022-11-29
Attending: PHYSICIAN ASSISTANT
Payer: COMMERCIAL

## 2022-11-29 ENCOUNTER — TELEPHONE (OUTPATIENT)
Dept: ORTHOPEDICS | Facility: CLINIC | Age: 82
End: 2022-11-29
Payer: MEDICARE

## 2022-11-29 VITALS
WEIGHT: 141.75 LBS | BODY MASS INDEX: 22.2 KG/M2 | DIASTOLIC BLOOD PRESSURE: 70 MMHG | TEMPERATURE: 98 F | HEART RATE: 77 BPM | SYSTOLIC BLOOD PRESSURE: 119 MMHG

## 2022-11-29 DIAGNOSIS — L98.9 FINGER LESION: Primary | ICD-10-CM

## 2022-11-29 DIAGNOSIS — B37.0 THRUSH, ORAL: ICD-10-CM

## 2022-11-29 DIAGNOSIS — L98.9 FINGER LESION: ICD-10-CM

## 2022-11-29 PROCEDURE — 1159F MED LIST DOCD IN RCRD: CPT | Mod: CPTII,S$GLB,, | Performed by: PHYSICIAN ASSISTANT

## 2022-11-29 PROCEDURE — 73130 X-RAY EXAM OF HAND: CPT | Mod: TC,LT

## 2022-11-29 PROCEDURE — 1159F PR MEDICATION LIST DOCUMENTED IN MEDICAL RECORD: ICD-10-PCS | Mod: CPTII,S$GLB,, | Performed by: PHYSICIAN ASSISTANT

## 2022-11-29 PROCEDURE — 1126F AMNT PAIN NOTED NONE PRSNT: CPT | Mod: CPTII,S$GLB,, | Performed by: PHYSICIAN ASSISTANT

## 2022-11-29 PROCEDURE — 3074F PR MOST RECENT SYSTOLIC BLOOD PRESSURE < 130 MM HG: ICD-10-PCS | Mod: CPTII,S$GLB,, | Performed by: PHYSICIAN ASSISTANT

## 2022-11-29 PROCEDURE — 1100F PTFALLS ASSESS-DOCD GE2>/YR: CPT | Mod: CPTII,S$GLB,, | Performed by: PHYSICIAN ASSISTANT

## 2022-11-29 PROCEDURE — 1126F PR PAIN SEVERITY QUANTIFIED, NO PAIN PRESENT: ICD-10-PCS | Mod: CPTII,S$GLB,, | Performed by: PHYSICIAN ASSISTANT

## 2022-11-29 PROCEDURE — 99203 OFFICE O/P NEW LOW 30 MIN: CPT | Mod: S$GLB,,, | Performed by: PHYSICIAN ASSISTANT

## 2022-11-29 PROCEDURE — 3288F FALL RISK ASSESSMENT DOCD: CPT | Mod: CPTII,S$GLB,, | Performed by: PHYSICIAN ASSISTANT

## 2022-11-29 PROCEDURE — 99203 PR OFFICE/OUTPT VISIT, NEW, LEVL III, 30-44 MIN: ICD-10-PCS | Mod: S$GLB,,, | Performed by: PHYSICIAN ASSISTANT

## 2022-11-29 PROCEDURE — 3288F PR FALLS RISK ASSESSMENT DOCUMENTED: ICD-10-PCS | Mod: CPTII,S$GLB,, | Performed by: PHYSICIAN ASSISTANT

## 2022-11-29 PROCEDURE — 3078F DIAST BP <80 MM HG: CPT | Mod: CPTII,S$GLB,, | Performed by: PHYSICIAN ASSISTANT

## 2022-11-29 PROCEDURE — 73130 XR HAND COMPLETE 3 VIEW LEFT: ICD-10-PCS | Mod: 26,LT,, | Performed by: RADIOLOGY

## 2022-11-29 PROCEDURE — 73130 X-RAY EXAM OF HAND: CPT | Mod: 26,LT,, | Performed by: RADIOLOGY

## 2022-11-29 PROCEDURE — 3074F SYST BP LT 130 MM HG: CPT | Mod: CPTII,S$GLB,, | Performed by: PHYSICIAN ASSISTANT

## 2022-11-29 PROCEDURE — 99999 PR PBB SHADOW E&M-EST. PATIENT-LVL V: CPT | Mod: PBBFAC,,, | Performed by: PHYSICIAN ASSISTANT

## 2022-11-29 PROCEDURE — 99999 PR PBB SHADOW E&M-EST. PATIENT-LVL V: ICD-10-PCS | Mod: PBBFAC,,, | Performed by: PHYSICIAN ASSISTANT

## 2022-11-29 PROCEDURE — 1100F PR PT FALLS ASSESS DOC 2+ FALLS/FALL W/INJURY/YR: ICD-10-PCS | Mod: CPTII,S$GLB,, | Performed by: PHYSICIAN ASSISTANT

## 2022-11-29 PROCEDURE — 3078F PR MOST RECENT DIASTOLIC BLOOD PRESSURE < 80 MM HG: ICD-10-PCS | Mod: CPTII,S$GLB,, | Performed by: PHYSICIAN ASSISTANT

## 2022-11-29 RX ORDER — NYSTATIN 100000 [USP'U]/ML
6 SUSPENSION ORAL 4 TIMES DAILY
Qty: 240 ML | Refills: 0 | Status: SHIPPED | OUTPATIENT
Start: 2022-11-29 | End: 2022-12-09

## 2022-11-29 NOTE — PROGRESS NOTES
Subjective:      Patient ID: Piter Hernandez is a 82 y.o. male.    Chief Complaint:Follow-up      History of Present Illness    Mr. Grace is self referred for L hand middle finger infection.  He says he has had problems with the distal L middle finger for 4-8 yrs now.  He had been followed by Dr. Kern at  for some time.  He was given creams and and at one point pills to treat it but cannot recall when but says the pills gave him some improvement but it got worse when stopped.  He has no pain at rest, only when there is pressure on it.  It has some drainage on lateral aspect of nail.  He says touching fish makes it worse.  He has not seen dermatology, had a biopsy or cultures, nor seen hand surgery.  He denies ever having imaging.  He also mentions having new onset cough. Of note, patient likes to fish.  The patient denies any recent fever, chills, or sweats.      Review of Systems   Constitutional: Positive for decreased appetite. Negative for chills, fever, malaise/fatigue, night sweats, weight gain and weight loss.   HENT:  Negative for congestion, ear pain, hearing loss, hoarse voice, sore throat and tinnitus.    Eyes:  Negative for blurred vision, redness and visual disturbance.   Cardiovascular:  Negative for chest pain, leg swelling and palpitations.   Respiratory:  Negative for cough, hemoptysis, shortness of breath, sputum production and wheezing.    Endocrine: Negative for cold intolerance and heat intolerance.   Hematologic/Lymphatic: Negative for adenopathy. Does not bruise/bleed easily.   Skin:  Negative for dry skin, itching, rash and suspicious lesions.   Musculoskeletal:  Negative for back pain, joint pain, myalgias and neck pain.   Gastrointestinal:  Negative for abdominal pain, constipation, diarrhea, heartburn, nausea and vomiting.   Genitourinary:  Negative for dysuria, flank pain, frequency, hematuria, hesitancy and urgency.   Neurological:  Positive for weakness. Negative for  dizziness, headaches, numbness and paresthesias.   Psychiatric/Behavioral:  Negative for depression and memory loss. The patient does not have insomnia and is not nervous/anxious.    Allergic/Immunologic: Negative for environmental allergies, HIV exposure, hives and persistent infections.   Objective:   Physical Exam  Constitutional:       General: He is not in acute distress.     Appearance: Normal appearance. He is well-developed. He is not ill-appearing, toxic-appearing or diaphoretic.   HENT:      Head: Normocephalic and atraumatic.      Mouth/Throat:      Mouth: No lacerations or oral lesions.      Dentition: Normal dentition. Does not have dentures. No dental caries or dental abscesses.      Pharynx: Uvula midline.   Eyes:      General: Lids are normal. No scleral icterus.     Conjunctiva/sclera: Conjunctivae normal.      Pupils: Pupils are equal, round, and reactive to light.   Cardiovascular:      Rate and Rhythm: Normal rate and regular rhythm.      Heart sounds: Normal heart sounds. No murmur heard.    No friction rub. No gallop.   Pulmonary:      Effort: Pulmonary effort is normal. No respiratory distress.      Breath sounds: Normal breath sounds. No decreased breath sounds, wheezing, rhonchi or rales.   Abdominal:      General: Bowel sounds are normal. There is no distension.      Palpations: Abdomen is soft. There is no mass.      Tenderness: There is no abdominal tenderness. There is no guarding or rebound.   Musculoskeletal:        Hands:       Cervical back: Neck supple.   Lymphadenopathy:      Head:      Right side of head: No submental, submandibular, tonsillar, preauricular, posterior auricular or occipital adenopathy.      Left side of head: No submental, submandibular, tonsillar, preauricular, posterior auricular or occipital adenopathy.      Cervical: No cervical adenopathy.      Upper Body:      Right upper body: No supraclavicular or epitrochlear adenopathy.      Left upper body: No  supraclavicular or epitrochlear adenopathy.   Skin:     General: Skin is warm and dry.      Coloration: Skin is not pale.      Findings: No erythema, lesion or rash.   Neurological:      Mental Status: He is alert and oriented to person, place, and time.      Cranial Nerves: No cranial nerve deficit.   Psychiatric:         Behavior: Behavior normal.               1. Finger lesion    2. 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

## 2022-11-29 NOTE — TELEPHONE ENCOUNTER
Spoke with pt and was able to get him scheduled for appointment. Pt voiced understanding to appointment day and time with no questions.

## 2022-12-02 ENCOUNTER — OFFICE VISIT (OUTPATIENT)
Dept: ORTHOPEDICS | Facility: CLINIC | Age: 82
End: 2022-12-02
Payer: COMMERCIAL

## 2022-12-02 ENCOUNTER — TELEPHONE (OUTPATIENT)
Dept: ADMINISTRATIVE | Facility: HOSPITAL | Age: 82
End: 2022-12-02
Payer: MEDICARE

## 2022-12-02 VITALS — BODY MASS INDEX: 22.25 KG/M2 | HEIGHT: 67 IN | WEIGHT: 141.75 LBS

## 2022-12-02 DIAGNOSIS — L98.9 FINGER LESION: ICD-10-CM

## 2022-12-02 PROCEDURE — 99203 OFFICE O/P NEW LOW 30 MIN: CPT | Mod: S$GLB,,, | Performed by: PLASTIC SURGERY

## 2022-12-02 PROCEDURE — 1125F AMNT PAIN NOTED PAIN PRSNT: CPT | Mod: CPTII,S$GLB,, | Performed by: PLASTIC SURGERY

## 2022-12-02 PROCEDURE — 99999 PR PBB SHADOW E&M-EST. PATIENT-LVL III: ICD-10-PCS | Mod: PBBFAC,,, | Performed by: PLASTIC SURGERY

## 2022-12-02 PROCEDURE — 1101F PT FALLS ASSESS-DOCD LE1/YR: CPT | Mod: CPTII,S$GLB,, | Performed by: PLASTIC SURGERY

## 2022-12-02 PROCEDURE — 99203 PR OFFICE/OUTPT VISIT, NEW, LEVL III, 30-44 MIN: ICD-10-PCS | Mod: S$GLB,,, | Performed by: PLASTIC SURGERY

## 2022-12-02 PROCEDURE — 1125F PR PAIN SEVERITY QUANTIFIED, PAIN PRESENT: ICD-10-PCS | Mod: CPTII,S$GLB,, | Performed by: PLASTIC SURGERY

## 2022-12-02 PROCEDURE — 1101F PR PT FALLS ASSESS DOC 0-1 FALLS W/OUT INJ PAST YR: ICD-10-PCS | Mod: CPTII,S$GLB,, | Performed by: PLASTIC SURGERY

## 2022-12-02 PROCEDURE — 3288F FALL RISK ASSESSMENT DOCD: CPT | Mod: CPTII,S$GLB,, | Performed by: PLASTIC SURGERY

## 2022-12-02 PROCEDURE — 99999 PR PBB SHADOW E&M-EST. PATIENT-LVL III: CPT | Mod: PBBFAC,,, | Performed by: PLASTIC SURGERY

## 2022-12-02 PROCEDURE — 1159F PR MEDICATION LIST DOCUMENTED IN MEDICAL RECORD: ICD-10-PCS | Mod: CPTII,S$GLB,, | Performed by: PLASTIC SURGERY

## 2022-12-02 PROCEDURE — 1159F MED LIST DOCD IN RCRD: CPT | Mod: CPTII,S$GLB,, | Performed by: PLASTIC SURGERY

## 2022-12-02 PROCEDURE — 3288F PR FALLS RISK ASSESSMENT DOCUMENTED: ICD-10-PCS | Mod: CPTII,S$GLB,, | Performed by: PLASTIC SURGERY

## 2022-12-02 RX ORDER — AMOXICILLIN AND CLAVULANATE POTASSIUM 875; 125 MG/1; MG/1
TABLET, FILM COATED ORAL
COMMUNITY
Start: 2022-11-02

## 2022-12-02 RX ORDER — AZITHROMYCIN 250 MG/1
TABLET, FILM COATED ORAL
COMMUNITY
Start: 2022-08-06

## 2022-12-02 RX ORDER — ALBUTEROL SULFATE 90 UG/1
AEROSOL, METERED RESPIRATORY (INHALATION)
COMMUNITY
Start: 2022-11-02

## 2022-12-02 NOTE — PROGRESS NOTES
Chief Complaint: Pain of the Left Hand and Possible Infection      HPI:    Piter Hernandez is a ambidextrous hand dominant 82 y.o. male presenting today for evaluation treatment of a chronic lesion to his left middle finger paronychia.  The patient states that for the last 20 years he has suffered from a painful disorder to the skin surrounding his left middle finger nail plate.  Many years ago he was a  and would often have his hand and water.  He began to experience a change in the skin along with pain around the nail bed.  He was prescribed oral medications and topicals to the area and had some improvement however he states over last several years the symptoms have progressively worsened.  He does have history of other areas of skin cancer particularl in the ear and scalp.  He states that he continues to have flaking skin surrounding the left middle finger nail plate with pain associated on the ulnar aspect.  He denies any active drainage of pus.  He states that his symptoms will wax and wane.  He is had no previous interventions were surgeries.  He believes he had a biopsy many years ago however he is unclear of the actual results..  Past Medical History:   Diagnosis Date    Anticoagulant long-term use     Arthritis     Cancer     Skin cancer--left ear    Hypertension     Squamous cell carcinoma of skin     Thyroid disease        Past Surgical History:   Procedure Laterality Date    COLONOSCOPY N/A 2016    Procedure: COLONOSCOPY Miralax split dose prep;  Surgeon: Luciano Mendoza Jr., MD;  Location: [facility];  Service: Endoscopy;  Laterality: N/A;        History reviewed. No pertinent family history.    Social History     Socioeconomic History    Marital status:    Tobacco Use    Smoking status: Former     Types: Cigarettes     Quit date: 1/10/1986     Years since quittin.9    Smokeless tobacco: Never   Substance and Sexual Activity    Alcohol use: Yes    Drug use: No        Review of patient's allergies indicates:  No Known Allergies      Current Outpatient Medications:     albuterol (PROVENTIL/VENTOLIN HFA) 90 mcg/actuation inhaler, Inhale 2 puffs every 6 (six) hours as needed into the lungs for Wheezing, Disp: , Rfl:     alprazolam (XANAX) 0.25 MG tablet, Take 0.25 mg by mouth 2 (two) times daily., Disp: , Rfl: 3    amoxicillin-clavulanate 875-125mg (AUGMENTIN) 875-125 mg per tablet, Take 1 tablet in the morning and 1 tablet before bedtime by mouth. Do all this for 10 days., Disp: , Rfl:     apixaban (ELIQUIS ORAL), Take by mouth., Disp: , Rfl:     APRISO 0.375 gram Cp24, TAKE 4 CAPSULES BY MOUTH DAILY, Disp: 120 capsule, Rfl: 3    aspirin (ECOTRIN) 81 MG EC tablet, Take 81 mg by mouth once daily., Disp: , Rfl:     atenolol (TENORMIN) 50 MG tablet, Take 50 mg by mouth once daily., Disp: , Rfl:     azathioprine (IMURAN) 50 mg Tab, TAKE 2 TABLETS DAILY, Disp: 180 tablet, Rfl: 5    azithromycin (Z-KENISHA) 250 MG tablet, TAKE BY MOUTH 2 TABS DAY 1 THEN 1 TAB DAILY DAYS 2 THROUGH 5 AS DIRECTED BY MD, Disp: , Rfl:     chlorzoxazone (PARAFON FORTE) 500 mg Tab, Take 250 mg by mouth 4 (four) times daily as needed., Disp: , Rfl:     fenofibrate micronized (LOFIBRA) 134 MG Cap, Take 134 mg by mouth daily with breakfast., Disp: , Rfl:     mesalamine (CANASA) 1000 MG Supp, Place 1 suppository (1,000 mg total) rectally nightly., Disp: 90 suppository, Rfl: 3    mupirocin (BACTROBAN) 2 % ointment, Apply to affected area 3 times daily, Disp: 22 g, Rfl: 1    nystatin (MYCOSTATIN) 100,000 unit/mL suspension, Take 6 mLs (600,000 Units total) by mouth 4 (four) times daily. for 10 days, Disp: 240 mL, Rfl: 0    rosuvastatin (CRESTOR) 10 MG tablet, Take 10 mg by mouth once daily., Disp: , Rfl:     tramadol (ULTRAM) 50 mg tablet, Take 50 mg by mouth Daily., Disp: , Rfl: 0    zolpidem (AMBIEN) 10 mg Tab, Take 10 mg by mouth every evening., Disp: , Rfl: 2        Review of Systems:  Constitutional: no fever  "or chills  ENT: no nasal congestion or sore throat  Respiratory: no cough or shortness of breath  Cardiovascular: no chest pain or palpitations  Gastrointestinal: no nausea or vomiting, PUD, GERD, NSAID intolerance  Genitourinary: no hematuria or dysuria  Integument/Breast: no rash or pruritis  Hematologic/Lymphatic: no easy bruising or lymphadenopathy  Musculoskeletal: see HPI  Neurological: no seizures or tremors  Behavioral/Psych: no auditory or visual hallucinations      Objective:      PHYSICAL EXAM:  Vitals:    12/02/22 1014   Weight: 64.3 kg (141 lb 12.1 oz)   Height: 5' 7" (1.702 m)   PainSc: 10-Worst pain ever     General Appearance: WDWN, NAD  Neuro/Psych: Mood & affect appropriate  Lungs: Respirations equal and unlabored.   CV: No apparent CV dysfunction, distal pulses intact, RRR  Skin: Intact throughout  Extremities:   Left Hand Exam     Other   Erythema: present  Sensation: normal  Pulse: present    Comments:  Erythematous scaly hyperkeratotic lesion surrounding the ulnar aspect of the paronychia to the middle finger with extension across the eponychial fold.  No evidence of lesion extending along the radial aspect of the digit.  There is tenderness to palpation with no evidence of purulent drainage.  The nail plate is firmly adhered to the nail bed.            DIAGNOSTICS/IMAGING:    Radiologist's Impression:     EXAMINATION:  XR HAND COMPLETE 3 VIEW LEFT     CLINICAL HISTORY:  L finger lesion - particular attention to distal L middle finger;. Disorder of the skin and subcutaneous tissue, unspecified     TECHNIQUE:  PA, lateral, and oblique views of the left hand were performed.     COMPARISON:  None     FINDINGS:  Mild DJD.  No fracture or dislocation.  No bone destruction identified       Comments: I have personnaly reviewed the imaging and I agree with the above radiologist's report.          Assessment:     Encounter Diagnosis   Name Primary?    Finger lesion           Plan/Discussion:   Piter" was seen today for possible infection and pain.    Diagnoses and all orders for this visit:    Finger lesion  -     Ambulatory referral/consult to Hand Surgery      Based on the patient's history of a chronic lesion to the middle finger I suggested that he return to clinic for a biopsy of the skin involved with the lesion.  I discussed I will perform this in the office with local anesthesia.  Based on the presentation there is a concern for possible squamous cell carcinoma given his history of squamous cell cancer in the past.  However I believe that this may be less likely due to the fact that is not increased in size over the many years that has been present.  It may be a chronic paronychia from chronic yeast infection however I do not see a rolled border of the eponychial fold.  I discussed that we will obtain the skin biopsy to rule out any carcinoma and then move forward with possible medical treatment.  The patient agree with this above assessment plan all questions concerns were addressed.  He will follow up next Wednesday for biopsy of the left middle finger.

## 2022-12-05 ENCOUNTER — TELEPHONE (OUTPATIENT)
Dept: ORTHOPEDICS | Facility: CLINIC | Age: 82
End: 2022-12-05
Payer: MEDICARE

## 2022-12-05 NOTE — TELEPHONE ENCOUNTER
Spoke with pt and he stated he would like to have the biopsy done on Wednesday.  Pt was informed his appt is at 11:15 am.  Pt verbalized understanding.

## 2022-12-05 NOTE — TELEPHONE ENCOUNTER
----- Message from Paola Madden sent at 12/5/2022  7:42 AM CST -----  Regarding: Pt biopsy Wednesday  Name of Who is Calling:ARNOLD CASTLE [[id number]]          What is the request in detail: Pt said that Dr said for him to call this morning to let him know if he wants to have the biopsy on his finger this Wednesday. Pt says that he does want to do it. He is asking for a call back to let him know the time for Wed and any details.          Can the clinic reply by [facility]:no          What Number to Call Back if not in [facility]:[phone]

## 2022-12-07 ENCOUNTER — OFFICE VISIT (OUTPATIENT)
Dept: ORTHOPEDICS | Facility: CLINIC | Age: 82
End: 2022-12-07
Payer: COMMERCIAL

## 2022-12-07 VITALS
BODY MASS INDEX: 22.13 KG/M2 | DIASTOLIC BLOOD PRESSURE: 80 MMHG | HEART RATE: 98 BPM | WEIGHT: 141 LBS | SYSTOLIC BLOOD PRESSURE: 124 MMHG | HEIGHT: 67 IN

## 2022-12-07 DIAGNOSIS — L98.9 FINGER LESION: Primary | ICD-10-CM

## 2022-12-07 DIAGNOSIS — D49.2 NEOPLASM OF UNSPECIFIED BEHAVIOR OF BONE, SOFT TISSUE, AND SKIN: ICD-10-CM

## 2022-12-07 PROCEDURE — 1159F MED LIST DOCD IN RCRD: CPT | Mod: CPTII,S$GLB,, | Performed by: PLASTIC SURGERY

## 2022-12-07 PROCEDURE — 1159F PR MEDICATION LIST DOCUMENTED IN MEDICAL RECORD: ICD-10-PCS | Mod: CPTII,S$GLB,, | Performed by: PLASTIC SURGERY

## 2022-12-07 PROCEDURE — 3079F PR MOST RECENT DIASTOLIC BLOOD PRESSURE 80-89 MM HG: ICD-10-PCS | Mod: CPTII,S$GLB,, | Performed by: PLASTIC SURGERY

## 2022-12-07 PROCEDURE — 88305 TISSUE EXAM BY PATHOLOGIST: ICD-10-PCS | Mod: 26,,, | Performed by: DERMATOLOGY

## 2022-12-07 PROCEDURE — 1125F PR PAIN SEVERITY QUANTIFIED, PAIN PRESENT: ICD-10-PCS | Mod: CPTII,S$GLB,, | Performed by: PLASTIC SURGERY

## 2022-12-07 PROCEDURE — 3074F PR MOST RECENT SYSTOLIC BLOOD PRESSURE < 130 MM HG: ICD-10-PCS | Mod: CPTII,S$GLB,, | Performed by: PLASTIC SURGERY

## 2022-12-07 PROCEDURE — 11106 INCAL BX SKN SINGLE LES: CPT | Mod: S$GLB,,, | Performed by: PLASTIC SURGERY

## 2022-12-07 PROCEDURE — 11106 PR INCISIONAL BIOPSY, SKIN, SINGLE LESION: ICD-10-PCS | Mod: S$GLB,,, | Performed by: PLASTIC SURGERY

## 2022-12-07 PROCEDURE — 88305 TISSUE EXAM BY PATHOLOGIST: CPT | Performed by: DERMATOLOGY

## 2022-12-07 PROCEDURE — 99499 NO LOS: ICD-10-PCS | Mod: S$GLB,,, | Performed by: PLASTIC SURGERY

## 2022-12-07 PROCEDURE — 99999 PR PBB SHADOW E&M-EST. PATIENT-LVL III: CPT | Mod: PBBFAC,,, | Performed by: PLASTIC SURGERY

## 2022-12-07 PROCEDURE — 99499 UNLISTED E&M SERVICE: CPT | Mod: S$GLB,,, | Performed by: PLASTIC SURGERY

## 2022-12-07 PROCEDURE — 1125F AMNT PAIN NOTED PAIN PRSNT: CPT | Mod: CPTII,S$GLB,, | Performed by: PLASTIC SURGERY

## 2022-12-07 PROCEDURE — 1101F PR PT FALLS ASSESS DOC 0-1 FALLS W/OUT INJ PAST YR: ICD-10-PCS | Mod: CPTII,S$GLB,, | Performed by: PLASTIC SURGERY

## 2022-12-07 PROCEDURE — 3288F FALL RISK ASSESSMENT DOCD: CPT | Mod: CPTII,S$GLB,, | Performed by: PLASTIC SURGERY

## 2022-12-07 PROCEDURE — 3074F SYST BP LT 130 MM HG: CPT | Mod: CPTII,S$GLB,, | Performed by: PLASTIC SURGERY

## 2022-12-07 PROCEDURE — 88305 TISSUE EXAM BY PATHOLOGIST: CPT | Mod: 26,,, | Performed by: DERMATOLOGY

## 2022-12-07 PROCEDURE — 99999 PR PBB SHADOW E&M-EST. PATIENT-LVL III: ICD-10-PCS | Mod: PBBFAC,,, | Performed by: PLASTIC SURGERY

## 2022-12-07 PROCEDURE — 1101F PT FALLS ASSESS-DOCD LE1/YR: CPT | Mod: CPTII,S$GLB,, | Performed by: PLASTIC SURGERY

## 2022-12-07 PROCEDURE — 3288F PR FALLS RISK ASSESSMENT DOCUMENTED: ICD-10-PCS | Mod: CPTII,S$GLB,, | Performed by: PLASTIC SURGERY

## 2022-12-07 PROCEDURE — 3079F DIAST BP 80-89 MM HG: CPT | Mod: CPTII,S$GLB,, | Performed by: PLASTIC SURGERY

## 2022-12-07 NOTE — PROGRESS NOTES
Piter Hernandez presents today for a lesion biopsy of the left middle finger.  I discussed the details of the procedure and informed consent was obtained.    The skin of the left middle finger was prepped with isopropyl alcohol and the left middle finger was blocked using 1% lidocaine without epinephrine.  This was allowed to take effect for several minutes.  The finger was then prepped with Betadine solution and draped in the usual sterile fashion.  Using a 15 blade scalpel an elliptical incision was made on the ulnar aspect of the left middle finger near the paronychia involving normal skin and the identified skin lesion.  This was removed in its entirety and placed into formalin and sent to the pathology lab for testing.  Next the incision was then closed with 2 interrupted 4-0 nylon sutures.  The wound was then cleaned and dried bacitracin was applied followed by dry dressing Coban.  The patient tolerated the procedure well without any complications.  He will follow up with me in 2 weeks for suture removal and discussion of the pathology report.

## 2022-12-12 ENCOUNTER — TELEPHONE (OUTPATIENT)
Dept: FAMILY MEDICINE | Facility: CLINIC | Age: 82
End: 2022-12-12
Payer: MEDICARE

## 2022-12-16 PROBLEM — L98.9 FINGER LESION: Status: ACTIVE | Noted: 2022-12-16

## 2022-12-20 LAB
FINAL PATHOLOGIC DIAGNOSIS: NORMAL
GROSS: NORMAL
Lab: NORMAL
MICROSCOPIC EXAM: NORMAL

## 2023-01-31 ENCOUNTER — TELEPHONE (OUTPATIENT)
Dept: ORTHOPEDICS | Facility: CLINIC | Age: 83
End: 2023-01-31
Payer: MEDICARE

## 2023-01-31 NOTE — TELEPHONE ENCOUNTER
Spoke with pt reminded of appointment date and time pt voiced understanding with no additional questions or concerns.

## 2023-02-01 ENCOUNTER — OFFICE VISIT (OUTPATIENT)
Dept: ORTHOPEDICS | Facility: CLINIC | Age: 83
End: 2023-02-01
Payer: COMMERCIAL

## 2023-02-01 ENCOUNTER — TELEPHONE (OUTPATIENT)
Dept: DERMATOLOGY | Facility: CLINIC | Age: 83
End: 2023-02-01
Payer: COMMERCIAL

## 2023-02-01 VITALS — BODY MASS INDEX: 22.15 KG/M2 | WEIGHT: 141.13 LBS | HEIGHT: 67 IN

## 2023-02-01 DIAGNOSIS — C44.629: Primary | ICD-10-CM

## 2023-02-01 PROCEDURE — 1159F PR MEDICATION LIST DOCUMENTED IN MEDICAL RECORD: ICD-10-PCS | Mod: CPTII,S$GLB,, | Performed by: PLASTIC SURGERY

## 2023-02-01 PROCEDURE — 1101F PT FALLS ASSESS-DOCD LE1/YR: CPT | Mod: CPTII,S$GLB,, | Performed by: PLASTIC SURGERY

## 2023-02-01 PROCEDURE — 3288F PR FALLS RISK ASSESSMENT DOCUMENTED: ICD-10-PCS | Mod: CPTII,S$GLB,, | Performed by: PLASTIC SURGERY

## 2023-02-01 PROCEDURE — 99212 OFFICE O/P EST SF 10 MIN: CPT | Mod: S$GLB,,, | Performed by: PLASTIC SURGERY

## 2023-02-01 PROCEDURE — 3288F FALL RISK ASSESSMENT DOCD: CPT | Mod: CPTII,S$GLB,, | Performed by: PLASTIC SURGERY

## 2023-02-01 PROCEDURE — 1125F AMNT PAIN NOTED PAIN PRSNT: CPT | Mod: CPTII,S$GLB,, | Performed by: PLASTIC SURGERY

## 2023-02-01 PROCEDURE — 1159F MED LIST DOCD IN RCRD: CPT | Mod: CPTII,S$GLB,, | Performed by: PLASTIC SURGERY

## 2023-02-01 PROCEDURE — 99999 PR PBB SHADOW E&M-EST. PATIENT-LVL III: CPT | Mod: PBBFAC,,, | Performed by: PLASTIC SURGERY

## 2023-02-01 PROCEDURE — 99212 PR OFFICE/OUTPT VISIT, EST, LEVL II, 10-19 MIN: ICD-10-PCS | Mod: S$GLB,,, | Performed by: PLASTIC SURGERY

## 2023-02-01 PROCEDURE — 1125F PR PAIN SEVERITY QUANTIFIED, PAIN PRESENT: ICD-10-PCS | Mod: CPTII,S$GLB,, | Performed by: PLASTIC SURGERY

## 2023-02-01 PROCEDURE — 99999 PR PBB SHADOW E&M-EST. PATIENT-LVL III: ICD-10-PCS | Mod: PBBFAC,,, | Performed by: PLASTIC SURGERY

## 2023-02-01 PROCEDURE — 1101F PR PT FALLS ASSESS DOC 0-1 FALLS W/OUT INJ PAST YR: ICD-10-PCS | Mod: CPTII,S$GLB,, | Performed by: PLASTIC SURGERY

## 2023-02-01 NOTE — TELEPHONE ENCOUNTER
----- Message from Isabel Medellin LPN sent at 2/1/2023 10:06 AM CST -----  Regarding: Appointment  Good Morning,    Can anyone be of assistance with helping to schedule Mr. Hernandez with an appointment. He is being referred back to your clinic by Dr. Serra.    Thanks,  Isabel

## 2023-02-01 NOTE — TELEPHONE ENCOUNTER
LM for pt regarding scheduling Mohs SCC L dorsal index metacarpophalangeal joint, Dr. Miller referral.

## 2023-02-07 ENCOUNTER — TELEPHONE (OUTPATIENT)
Dept: PAIN MEDICINE | Facility: CLINIC | Age: 83
End: 2023-02-07
Payer: MEDICARE

## 2023-02-07 ENCOUNTER — TELEPHONE (OUTPATIENT)
Dept: DERMATOLOGY | Facility: CLINIC | Age: 83
End: 2023-02-07
Payer: MEDICARE

## 2023-02-07 NOTE — TELEPHONE ENCOUNTER
Called pt because new bx SCC midfrontal scalp was received from Dr. Bhavik Alvarez but was unsure if SCC L dorsal index metacarpophalangeal joint (Dr. Delahoussaye-Rodriguez) was treated. Pt stated no, neither has been treated and pt is requesting Mohs for both. Additionally, pt had bx done by Dr. Calderon for SCCIS L middle finger; pt would also like Mohs for this spot. Informed pt Dr. Mcbride will review all paths & photos and will call pt later today for scheduling 3 Mohs procedures.

## 2023-02-07 NOTE — TELEPHONE ENCOUNTER
This message is for patient in regards to his/her appointment 02/08/23 at 1:00p   With Megha Magana MD.        [facility] Policy: For the safety of all patients and staff members.   During this visit we're informing all patients and visitors to wear face mask at all time here at [facility].  If you have any questions or concerns please contact [phone]       also inquired IPM within message.    [facility] providers and Mid-levels offer interventional, procedure--based options to treat chronic pain. The goal is to manage chronic pain by reducing pain frequency and intensity and address your functional goals for activities of daily living while simultaneously reducing or eliminating your reliance on medications. Please bring any records or images that you have from prior treatments for your pain. You will be presented with multi-modal treatment plan that may or may not include imaging, interventional procedures, physical/occupational/aqua therapy, pain creams, and non-narcotic pain medications used for the treatments of chronic pain.     Pt verbalized understanding and confirmed appt

## 2023-02-08 ENCOUNTER — TELEPHONE (OUTPATIENT)
Dept: DERMATOLOGY | Facility: CLINIC | Age: 83
End: 2023-02-08
Payer: MEDICARE

## 2023-02-08 ENCOUNTER — OFFICE VISIT (OUTPATIENT)
Dept: PAIN MEDICINE | Facility: CLINIC | Age: 83
End: 2023-02-08
Payer: MEDICARE

## 2023-02-08 ENCOUNTER — HOSPITAL ENCOUNTER (OUTPATIENT)
Dept: RADIOLOGY | Facility: OTHER | Age: 83
Discharge: HOME | End: 2023-02-08
Attending: STUDENT IN AN ORGANIZED HEALTH CARE EDUCATION/TRAINING PROGRAM
Payer: MEDICARE

## 2023-02-08 VITALS
HEIGHT: 67 IN | WEIGHT: 150.13 LBS | RESPIRATION RATE: 19 BRPM | TEMPERATURE: 97 F | SYSTOLIC BLOOD PRESSURE: 142 MMHG | BODY MASS INDEX: 23.56 KG/M2 | DIASTOLIC BLOOD PRESSURE: 77 MMHG | HEART RATE: 65 BPM

## 2023-02-08 DIAGNOSIS — M51.36 DEGENERATIVE DISC DISEASE, LUMBAR: ICD-10-CM

## 2023-02-08 DIAGNOSIS — G89.29 CHRONIC BILATERAL LOW BACK PAIN WITHOUT SCIATICA: ICD-10-CM

## 2023-02-08 DIAGNOSIS — M54.50 CHRONIC BILATERAL LOW BACK PAIN WITHOUT SCIATICA: Primary | ICD-10-CM

## 2023-02-08 DIAGNOSIS — G89.29 CHRONIC BILATERAL LOW BACK PAIN WITHOUT SCIATICA: Primary | ICD-10-CM

## 2023-02-08 DIAGNOSIS — M54.50 CHRONIC BILATERAL LOW BACK PAIN WITHOUT SCIATICA: ICD-10-CM

## 2023-02-08 PROCEDURE — 3288F FALL RISK ASSESSMENT DOCD: CPT | Mod: CPTII,S$GLB,, | Performed by: ANESTHESIOLOGY

## 2023-02-08 PROCEDURE — 3077F PR MOST RECENT SYSTOLIC BLOOD PRESSURE >= 140 MM HG: ICD-10-PCS | Mod: CPTII,S$GLB,, | Performed by: ANESTHESIOLOGY

## 2023-02-08 PROCEDURE — 3078F DIAST BP <80 MM HG: CPT | Mod: CPTII,S$GLB,, | Performed by: ANESTHESIOLOGY

## 2023-02-08 PROCEDURE — 3288F PR FALLS RISK ASSESSMENT DOCUMENTED: ICD-10-PCS | Mod: CPTII,S$GLB,, | Performed by: ANESTHESIOLOGY

## 2023-02-08 PROCEDURE — 72114 X-RAY EXAM L-S SPINE BENDING: CPT | Mod: TC,FY

## 2023-02-08 PROCEDURE — 1126F AMNT PAIN NOTED NONE PRSNT: CPT | Mod: CPTII,S$GLB,, | Performed by: ANESTHESIOLOGY

## 2023-02-08 PROCEDURE — 1159F MED LIST DOCD IN RCRD: CPT | Mod: CPTII,S$GLB,, | Performed by: ANESTHESIOLOGY

## 2023-02-08 PROCEDURE — 3078F PR MOST RECENT DIASTOLIC BLOOD PRESSURE < 80 MM HG: ICD-10-PCS | Mod: CPTII,S$GLB,, | Performed by: ANESTHESIOLOGY

## 2023-02-08 PROCEDURE — 1126F PR PAIN SEVERITY QUANTIFIED, NO PAIN PRESENT: ICD-10-PCS | Mod: CPTII,S$GLB,, | Performed by: ANESTHESIOLOGY

## 2023-02-08 PROCEDURE — 3077F SYST BP >= 140 MM HG: CPT | Mod: CPTII,S$GLB,, | Performed by: ANESTHESIOLOGY

## 2023-02-08 PROCEDURE — 1101F PT FALLS ASSESS-DOCD LE1/YR: CPT | Mod: CPTII,S$GLB,, | Performed by: ANESTHESIOLOGY

## 2023-02-08 PROCEDURE — 99204 OFFICE O/P NEW MOD 45 MIN: CPT | Mod: S$GLB,,, | Performed by: ANESTHESIOLOGY

## 2023-02-08 PROCEDURE — 99999 PR PBB SHADOW E&M-EST. PATIENT-LVL V: CPT | Mod: PBBFAC,,, | Performed by: ANESTHESIOLOGY

## 2023-02-08 PROCEDURE — 1160F RVW MEDS BY RX/DR IN RCRD: CPT | Mod: CPTII,S$GLB,, | Performed by: ANESTHESIOLOGY

## 2023-02-08 PROCEDURE — 72114 XR LUMBAR SPINE 5 VIEW WITH FLEX AND EXT: ICD-10-PCS | Mod: 26,,, | Performed by: RADIOLOGY

## 2023-02-08 PROCEDURE — 1101F PR PT FALLS ASSESS DOC 0-1 FALLS W/OUT INJ PAST YR: ICD-10-PCS | Mod: CPTII,S$GLB,, | Performed by: ANESTHESIOLOGY

## 2023-02-08 PROCEDURE — 1160F PR REVIEW ALL MEDS BY PRESCRIBER/CLIN PHARMACIST DOCUMENTED: ICD-10-PCS | Mod: CPTII,S$GLB,, | Performed by: ANESTHESIOLOGY

## 2023-02-08 PROCEDURE — 99999 PR PBB SHADOW E&M-EST. PATIENT-LVL V: ICD-10-PCS | Mod: PBBFAC,,, | Performed by: ANESTHESIOLOGY

## 2023-02-08 PROCEDURE — 72114 X-RAY EXAM L-S SPINE BENDING: CPT | Mod: 26,,, | Performed by: RADIOLOGY

## 2023-02-08 PROCEDURE — 99204 PR OFFICE/OUTPT VISIT, NEW, LEVL IV, 45-59 MIN: ICD-10-PCS | Mod: S$GLB,,, | Performed by: ANESTHESIOLOGY

## 2023-02-08 PROCEDURE — 1159F PR MEDICATION LIST DOCUMENTED IN MEDICAL RECORD: ICD-10-PCS | Mod: CPTII,S$GLB,, | Performed by: ANESTHESIOLOGY

## 2023-02-08 NOTE — PROGRESS NOTES
PCP: Anthony Tian MD    REFERRING PHYSICIAN: *Eva Leon    CHIEF COMPLAINT: Low back pain    Original HISTORY OF PRESENT ILLNESS: Piter Hernandez presents to the clinic for the evaluation of the above pain. The pain started     Of note, patient does have a history of Afib and prior-CVA and is currently on Eliquis.    Original Pain Description:  The pain is located in the low back, mostly on the right side, and does not radiate. The pain is described as aching, sharp, and stabbing. Exacerbating factors: Sitting, Standing, Bending, Walking, Extension, Flexing, and Lifting. Mitigating factors heat, ice, massage, and medications. Symptoms interfere with daily activity and sleeping. The patient feels like symptoms have been unchanged. Patient denies urinary incontinence, bowel incontinence, significant weight loss, significant motor weakness, and loss of sensations.    Original PAIN SCORES:  Best: Pain is 0  Worst: Pain is 9  Usually: Pain is 7  Current: Pain is 0    INTERVAL HISTORY:     6 weeks of Conservative therapy (PT/Chiro/Home Exercises with Dates)  Chiropractor - Currently  Acupuncture - 10 years ago  Massage - as often as possible    Treatments / Medications: (Ice/Heat/NSAIDS/APAP/etc):  Tramodol  Ambien  Heat/Ice  Norco (in the past from previous surgery)  Acetaminophen (upset stomach)     Report:  Reviewed and consistent with medication use as prescribed    Interventional Pain Procedures: (Previous injections)  Back injections 6 years ago and again 4 years ago - unsure of what was done but didn't help    Past Medical History:   Diagnosis Date    Anticoagulant long-term use     Arthritis     Cancer     Skin cancer--left ear    Hypertension     Squamous cell carcinoma of skin     Thyroid disease      Past Surgical History:   Procedure Laterality Date    COLONOSCOPY N/A 9/12/2016    Procedure: COLONOSCOPY Miralax split dose prep;  Surgeon: Luciano Mendoza Jr., MD;  Location:  [facility];  Service: Endoscopy;  Laterality: N/A;     Social History     Socioeconomic History    Marital status:    Tobacco Use    Smoking status: Former     Types: Cigarettes     Quit date: 1/10/1986     Years since quittin.1    Smokeless tobacco: Never   Substance and Sexual Activity    Alcohol use: Yes    Drug use: No     No family history on file.    Review of patient's allergies indicates:  No Known Allergies    Current Outpatient Medications   Medication Sig    albuterol (PROVENTIL/VENTOLIN HFA) 90 mcg/actuation inhaler Inhale 2 puffs every 6 (six) hours as needed into the lungs for Wheezing    alprazolam (XANAX) 0.25 MG tablet Take 0.25 mg by mouth 2 (two) times daily.    amoxicillin-clavulanate 875-125mg (AUGMENTIN) 875-125 mg per tablet Take 1 tablet in the morning and 1 tablet before bedtime by mouth. Do all this for 10 days.    apixaban (ELIQUIS ORAL) Take by mouth.    APRISO 0.375 gram Cp24 TAKE 4 CAPSULES BY MOUTH DAILY    aspirin (ECOTRIN) 81 MG EC tablet Take 81 mg by mouth once daily.    atenolol (TENORMIN) 50 MG tablet Take 50 mg by mouth once daily.    azathioprine (IMURAN) 50 mg Tab TAKE 2 TABLETS DAILY    azithromycin (Z-KENISHA) 250 MG tablet TAKE BY MOUTH 2 TABS DAY 1 THEN 1 TAB DAILY DAYS 2 THROUGH 5 AS DIRECTED BY MD    chlorzoxazone (PARAFON FORTE) 500 mg Tab Take 250 mg by mouth 4 (four) times daily as needed.    fenofibrate micronized (LOFIBRA) 134 MG Cap Take 134 mg by mouth daily with breakfast.    mesalamine (CANASA) 1000 MG Supp Place 1 suppository (1,000 mg total) rectally nightly.    mupirocin (BACTROBAN) 2 % ointment Apply to affected area 3 times daily    rosuvastatin (CRESTOR) 10 MG tablet Take 10 mg by mouth once daily.    tramadol (ULTRAM) 50 mg tablet Take 50 mg by mouth Daily.    zolpidem (AMBIEN) 10 mg Tab Take 10 mg by mouth every evening.     No current facility-administered medications for this visit.     ROS:  GENERAL: No fever. No chills. No fatigue. Denies  "weight loss. Denies weight gain.  HEENT: Denies headaches. Denies vision change. Denies eye pain. Denies double vision. Denies ear pain.   CV: Denies chest pain.   PULM: Denies of shortness of breath.  GI: Denies constipation. No diarrhea. No abdominal pain. Denies nausea. Denies vomiting. No blood in stool.  HEME: Denies bleeding problems.  : Denies urgency. No painful urination. No blood in urine.  MS: Denies joint stiffness. Denies joint swelling.  Denies back pain.  SKIN: Denies rash.   NEURO: Denies seizures. No weakness.  PSYCH:  Denies difficulty sleeping. No anxiety. Denies depression. No suicidal thoughts.     VITALS:   Vitals:    02/08/23 1315   BP: (!) 142/77   Pulse: 65   Resp: 19   Temp: 97.1 °F (36.2 °C)   TempSrc: Oral   Weight: 68.1 kg (150 lb 2.1 oz)   Height: 5' 7" (1.702 m)   PainSc: 0-No pain   PainLoc: Back     PHYSICAL EXAM:   GENERAL: Well appearing, in no acute distress, alert and oriented x3.  PSYCH:  Mood and affect appropriate.  SKIN: Skin color, texture, turgor normal, no rashes or lesions.  HEENT:  Normocephalic, atraumatic. Cranial nerves grossly intact.  PULM: No evidence of respiratory difficulty, symmetric chest rise.  GI:  Non-distended  BACK: Normal range of motion.  Patient reports pain with both flexion and extension, greater with flexion.  Facet loading is positive bilaterally.  Milgram's positive.  Femoral nerve stretch negative.  There is tenderness to palpation over the lower lumbar facet joints, more so on the right.  There is no pain with palpation over the sacroiliac joints bilaterally. Straight leg raising in the supine position is negative to radicular pain.   EXTREMITIES: No deformities, edema, or skin discoloration.   MUSCULOSKELETAL:  Logroll negative.  FADIR negative.  Tani's maneuver elicits pain across the low back bilaterally.  Gaenslen is negative.  Bilateral upper and lower extremity strength is normal and symmetric. No atrophy is noted.  NEURO: Sensation " is equal and appropriate bilaterally. Bilateral upper and lower extremity coordination and muscle stretch reflexes are physiologic and symmetric.  GAIT: Normal.    IMAGING:    None available.    ASSESSMENT: 82 y.o. year old male with pain, consistent with:    Encounter Diagnoses   Name Primary?    Chronic bilateral low back pain without sciatica Yes    Degenerative disc disease, lumbar      DISCUSSION: Mr. Hernandez is a very nice octogenarian who comes to us with chronic low back pain. We have no imaging at presentation.     PLAN:  Will order Xrays of the lumbar spine with flexion and extension views.  Will order MRI of the lumbar spine without contrast.  Referral placed to [facility].  RTC 2 weeks to discuss imaging results.  Will discuss neck pain in the future as needed.      I would like to thank *Eva Leon for the opportunity to assist in the care of this patient. We had a very nice visit and I look forward to continuing their care. Please let me know if I can be of further assistance.     Sal Grace MD  [facility], PGY-4

## 2023-02-08 NOTE — TELEPHONE ENCOUNTER
EP scheduled for same-day Mohs SCC mid-frontal scalp 4/5 7:40am, Dr. Bhavik Alvarez referral. Pt confirmed date, time, and location. Explained that two other SCCs of L middle finger & L hand will be discussed with pt by Dr. Mcbride.

## 2023-02-10 ENCOUNTER — TELEPHONE (OUTPATIENT)
Dept: DERMATOLOGY | Facility: CLINIC | Age: 83
End: 2023-02-10
Payer: MEDICARE

## 2023-02-10 NOTE — TELEPHONE ENCOUNTER
Called pt with cancellation 2/14 8:00am. Pt confirmed new date & time from Mohs SCC mid frontal scalp; cancelled original appt 4/5 7:40am.

## 2023-02-14 ENCOUNTER — PROCEDURE VISIT (OUTPATIENT)
Dept: DERMATOLOGY | Facility: CLINIC | Age: 83
End: 2023-02-14
Payer: MEDICARE

## 2023-02-14 VITALS
WEIGHT: 150 LBS | BODY MASS INDEX: 23.54 KG/M2 | HEART RATE: 59 BPM | DIASTOLIC BLOOD PRESSURE: 95 MMHG | SYSTOLIC BLOOD PRESSURE: 181 MMHG | HEIGHT: 67 IN

## 2023-02-14 DIAGNOSIS — C44.42 SQUAMOUS CELL CARCINOMA OF SCALP: Primary | ICD-10-CM

## 2023-02-14 DIAGNOSIS — D04.62 SQUAMOUS CELL CARCINOMA IN SITU OF SKIN OF FINGER OF LEFT HAND: ICD-10-CM

## 2023-02-14 PROCEDURE — 17312: ICD-10-PCS | Mod: S$GLB,,, | Performed by: DERMATOLOGY

## 2023-02-14 PROCEDURE — 17311: ICD-10-PCS | Mod: 51,S$GLB,, | Performed by: DERMATOLOGY

## 2023-02-14 PROCEDURE — 17311 MOHS 1 STAGE H/N/HF/G: CPT | Mod: 51,S$GLB,, | Performed by: DERMATOLOGY

## 2023-02-14 PROCEDURE — 99499 NO LOS: ICD-10-PCS | Mod: S$GLB,,, | Performed by: DERMATOLOGY

## 2023-02-14 PROCEDURE — 13121 CMPLX RPR S/A/L 2.6-7.5 CM: CPT | Mod: S$GLB,,, | Performed by: DERMATOLOGY

## 2023-02-14 PROCEDURE — 99499 UNLISTED E&M SERVICE: CPT | Mod: S$GLB,,, | Performed by: DERMATOLOGY

## 2023-02-14 PROCEDURE — 13121 PR RECMPL WND SCALP,EXTR 2.6-7.5 CM: ICD-10-PCS | Mod: S$GLB,,, | Performed by: DERMATOLOGY

## 2023-02-14 PROCEDURE — 17312 MOHS ADDL STAGE: CPT | Mod: S$GLB,,, | Performed by: DERMATOLOGY

## 2023-02-14 RX ORDER — CLOPIDOGREL BISULFATE 75 MG/1
75 TABLET ORAL
COMMUNITY
Start: 2023-02-03

## 2023-02-14 RX ORDER — FLUOROURACIL 50 MG/G
CREAM TOPICAL 2 TIMES DAILY
Qty: 60 G | Refills: 2 | Status: SHIPPED | OUTPATIENT
Start: 2023-02-14 | End: 2023-03-07

## 2023-02-14 RX ORDER — HYDROCODONE BITARTRATE AND ACETAMINOPHEN 5; 325 MG/1; MG/1
1 TABLET ORAL EVERY 6 HOURS PRN
Qty: 10 TABLET | Refills: 0 | Status: SHIPPED | OUTPATIENT
Start: 2023-02-14

## 2023-02-14 RX ORDER — CEPHALEXIN 500 MG/1
500 CAPSULE ORAL 3 TIMES DAILY
Qty: 30 CAPSULE | Refills: 0 | Status: SHIPPED | OUTPATIENT
Start: 2023-02-14 | End: 2023-02-24

## 2023-02-14 NOTE — PROGRESS NOTES
PROCEDURE: Mohs' Micrographic Surgery    INDICATION: Biopsy-proven skin cancer of cosmetically and functionally important areas, including head, neck, genital, hand, foot, or areas known for having difficulty in healing, such as the lower anterior legs. Tumor with ill-defined borders.    REFERRING PROVIDER: Bhavik Alvarez M.D.    CASE NUMBER:     ANESTHETIC: 6 cc 0.5% Bupivacaine with Epi 1:200,000 mixed 1:1 with plain 1% Lidocaine and 0.5 cc 1% Lidocaine with Epinephrine 1:100,000    SURGICAL PREP: Hibiclens    SURGEON: Martin Mcbride MD    ASSISTANTS: Diane Sepulveda PA-C, Angi Reynolds, Surg Tech, and Chema Melendez, Surg Tech    PREOPERATIVE DIAGNOSIS: squamous cell carcinoma    POSTOPERATIVE DIAGNOSIS: squamous cell carcinoma- superficial    PATHOLOGIC DIAGNOSIS: squamous cell carcinoma- superficial    HISTOLOGY OF SPECIMENS IN FIRST STAGE:   Tumor Type: Tumor seen. Superficial squamous cell carcinoma: Proliferation of squamous cells exhibiting atypia and infiltrating within the superficial papillary dermis.   Depth of Invasion: epidermis and dermis  Perineural Invasion: No    HISTOLOGY OF SPECIMENS IN SUBSEQUENT STAGES:  Tumor Type: No tumor seen. Hypertrophic actinic keratosis: Keratinocyte atypia along the basal layer.    STAGES OF MOHS' SURGERY PERFORMED: 2    TUMOR-FREE PLANE ACHIEVED: Yes    HEMOSTASIS: electrocoagulation     SPECIMENS: 3 (2 in stage A and 1 in stage B)    LOCATION: mid frontal scalp. Location verified with Dr. Alvarez's clinical photograph. Patient also verified location by looking at photo taken prior to procedure.     INITIAL LESION SIZE: 0.7 x 0.9 cm    FINAL DEFECT SIZE: 1.2 x 1.3 cm    WOUND REPAIR/DISPOSITION: The patient tolerated Mohs' Micrographic Surgery for a squamous cell carcinoma very well. When the tumor was completely removed, a repair of the surgical defect was undertaken.    PROCEDURE: Complex Linear Repair    INDICATION: Status post Mohs' Micrographic Surgery for  "squamous cell carcinoma.    CASE NUMBER:     SURGEON: Martin Mcbride MD    ASSISTANTS: Fransisco Gavin    ANESTHETIC: 2 cc 1% Lidocaine with Epinephrine 1:100,000    SURGICAL PREP: Hibiclens, prepped by Fransisco Gavin    LOCATION: mid frontal scalp    DEFECT SIZE: 1.2 x 1.3 cm    WOUND REPAIR/DISPOSITION:  After the patient's carcinoma had been completely removed with Mohs' Micrographic Surgery, a repair of the surgical defect was undertaken. The patient was returned to the operating suite where the area of mid frontal scalp was prepped, draped, and anesthetized in the usual sterile fashion. The wound was widely undermined in all directions. The wound was undermined to a distance at least the maximum width of the defect as measured perpendicular to the closure line along at least one entire edge of the defect, in this case 2 cm. Then, electrocoagulation was used to obtain meticulous hemostasis. 3-0 Vicryl buried vertical mattress sutures were placed into the subcutaneous and dermal plane to close the wound and diana the cutaneous wound edge. Bilateral dog ears were identified and were removed by a standard Burow's triangle technique. The cutaneous wound edges were closed using interrupted 3-0 Prolene suture.    The patient tolerated the procedure well.    The area was cleaned and dressed appropriately and the patient was given wound care instructions, as well as appointment for follow-up evaluation and suture removal in 14 days. Patient was placed on Norco 5-325 mg prn postop pain and Keflex 500 mg TID x 10 days.    LENGTH OF REPAIR: 4 cm    Vitals:    02/14/23 0746 02/14/23 1109   BP: (!) 151/74 (!) 181/95   BP Location: Left arm Right arm   Patient Position: Sitting Sitting   BP Method: Medium (Automatic) Small (Automatic)   Pulse: 71 (!) 59   Weight: 68 kg (150 lb)    Height: 5' 7" (1.702 m)        NOTE: Patient also with biopsy proven SCCIS L middle finger biopsied by Dr. Jason Serra " in [facility].   Per patient, has had this lesion x 10 yrs.   L middle finger with a 5 x 5 mm crusted hyperkeratotic plaque with larger, diffuse surrounding scaly erythema of the dorsal distal phalange including proximal nail fold.  No nodularity.   Given the superficial nature of the biopsy, we will treat the left middle finger with Efudex 5% cream BID x 3 weeks. Discussed redness, crusting, and scabbing. May use Aquaphor as needed.   Sharp debridement of hyperkeratotic biopsy site today - etoh prep, 0.5 cc of 1% lidocaine with epi 1:100,000, hibiclens prep, shave removal of hyperkeratosis.    In doing so, this will increase efficacy of penetration of topical 5-FU. Will have patient follow up in 8 weeks for recheck of biopsy site and consider Mohs pending progression.  eRx'd Efudex 5% cream to patient's preferred pharmacy today.          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    No evidence of residual disease or obvious SCC in this location today. Path report stated severe atypia with suspicious for SCC and excision recommended.  However upon clinical exam today, no nodularity or scale appreciated. Rec going back to Dr. Miller for re-biopsy omid punch biopsy.

## 2023-02-23 ENCOUNTER — HOSPITAL ENCOUNTER (OUTPATIENT)
Dept: RADIOLOGY | Facility: HOSPITAL | Age: 83
Discharge: HOME | End: 2023-02-23
Attending: INTERNAL MEDICINE
Payer: MEDICARE

## 2023-02-23 DIAGNOSIS — M54.50 CHRONIC BILATERAL LOW BACK PAIN WITHOUT SCIATICA: ICD-10-CM

## 2023-02-23 DIAGNOSIS — G89.29 CHRONIC BILATERAL LOW BACK PAIN WITHOUT SCIATICA: ICD-10-CM

## 2023-02-23 PROCEDURE — 72148 MRI LUMBAR SPINE W/O DYE: CPT | Mod: 26,,, | Performed by: RADIOLOGY

## 2023-02-23 PROCEDURE — 72148 MRI LUMBAR SPINE WITHOUT CONTRAST: ICD-10-PCS | Mod: 26,,, | Performed by: RADIOLOGY

## 2023-02-23 PROCEDURE — 72148 MRI LUMBAR SPINE W/O DYE: CPT | Mod: TC

## 2023-02-28 ENCOUNTER — OFFICE VISIT (OUTPATIENT)
Dept: DERMATOLOGY | Facility: CLINIC | Age: 83
End: 2023-02-28
Payer: MEDICARE

## 2023-02-28 DIAGNOSIS — Z09 POSTOP CHECK: Primary | ICD-10-CM

## 2023-02-28 PROCEDURE — 99999 PR PBB SHADOW E&M-EST. PATIENT-LVL I: CPT | Mod: PBBFAC,,, | Performed by: DERMATOLOGY

## 2023-02-28 PROCEDURE — 3288F PR FALLS RISK ASSESSMENT DOCUMENTED: ICD-10-PCS | Mod: CPTII,S$GLB,, | Performed by: DERMATOLOGY

## 2023-02-28 PROCEDURE — 1126F AMNT PAIN NOTED NONE PRSNT: CPT | Mod: CPTII,S$GLB,, | Performed by: DERMATOLOGY

## 2023-02-28 PROCEDURE — 1101F PT FALLS ASSESS-DOCD LE1/YR: CPT | Mod: CPTII,S$GLB,, | Performed by: DERMATOLOGY

## 2023-02-28 PROCEDURE — 1126F PR PAIN SEVERITY QUANTIFIED, NO PAIN PRESENT: ICD-10-PCS | Mod: CPTII,S$GLB,, | Performed by: DERMATOLOGY

## 2023-02-28 PROCEDURE — 3288F FALL RISK ASSESSMENT DOCD: CPT | Mod: CPTII,S$GLB,, | Performed by: DERMATOLOGY

## 2023-02-28 PROCEDURE — 99999 PR PBB SHADOW E&M-EST. PATIENT-LVL I: ICD-10-PCS | Mod: PBBFAC,,, | Performed by: DERMATOLOGY

## 2023-02-28 PROCEDURE — 99024 POSTOP FOLLOW-UP VISIT: CPT | Mod: S$GLB,,, | Performed by: DERMATOLOGY

## 2023-02-28 PROCEDURE — 1101F PR PT FALLS ASSESS DOC 0-1 FALLS W/OUT INJ PAST YR: ICD-10-PCS | Mod: CPTII,S$GLB,, | Performed by: DERMATOLOGY

## 2023-02-28 PROCEDURE — 99024 PR POST-OP FOLLOW-UP VISIT: ICD-10-PCS | Mod: S$GLB,,, | Performed by: DERMATOLOGY

## 2023-02-28 NOTE — PROGRESS NOTES
82 y.o. male patient is here for suture removal following Mohs' surgery.    Patient reports no problems with mid frontal scalp. Using topical 5FU on finger, not getting too inflamed.     WOUND PE:  The mid frontal scalp sutures intact. Wound healing well. Good skin edges. No signs or symptoms of infection.    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    PLAN:  Sutures removed today by  Kailee Crowder MA . Steri-strips applied.  Continue wound care.  Keep moist with Aquaphor.  Do Efudex to finger x 2 more weeks, and this time occlude at night with saran wrap to increase penetration - demonstrated today.     RTC:  5-6 weeks

## 2023-03-08 ENCOUNTER — OFFICE VISIT (OUTPATIENT)
Dept: PAIN MEDICINE | Facility: CLINIC | Age: 83
End: 2023-03-08
Payer: MEDICARE

## 2023-03-08 VITALS
TEMPERATURE: 98 F | RESPIRATION RATE: 19 BRPM | HEART RATE: 72 BPM | DIASTOLIC BLOOD PRESSURE: 71 MMHG | HEIGHT: 67 IN | SYSTOLIC BLOOD PRESSURE: 121 MMHG | BODY MASS INDEX: 21.48 KG/M2 | WEIGHT: 136.88 LBS

## 2023-03-08 DIAGNOSIS — M43.06 SPONDYLOLYSIS OF LUMBAR REGION: Primary | ICD-10-CM

## 2023-03-08 DIAGNOSIS — M54.2 CERVICALGIA: ICD-10-CM

## 2023-03-08 DIAGNOSIS — M51.36 DDD (DEGENERATIVE DISC DISEASE), LUMBAR: ICD-10-CM

## 2023-03-08 PROBLEM — M51.369 DDD (DEGENERATIVE DISC DISEASE), LUMBAR: Status: ACTIVE | Noted: 2023-03-08

## 2023-03-08 PROBLEM — M47.816 LUMBAR FACET ARTHROPATHY: Status: ACTIVE | Noted: 2023-03-08

## 2023-03-08 PROBLEM — M43.16 SPONDYLOLISTHESIS OF LUMBAR REGION: Status: ACTIVE | Noted: 2023-03-08

## 2023-03-08 PROCEDURE — 99214 OFFICE O/P EST MOD 30 MIN: CPT | Mod: S$GLB,,, | Performed by: ANESTHESIOLOGY

## 2023-03-08 PROCEDURE — 1101F PT FALLS ASSESS-DOCD LE1/YR: CPT | Mod: CPTII,S$GLB,, | Performed by: ANESTHESIOLOGY

## 2023-03-08 PROCEDURE — 3078F DIAST BP <80 MM HG: CPT | Mod: CPTII,S$GLB,, | Performed by: ANESTHESIOLOGY

## 2023-03-08 PROCEDURE — 3288F FALL RISK ASSESSMENT DOCD: CPT | Mod: CPTII,S$GLB,, | Performed by: ANESTHESIOLOGY

## 2023-03-08 PROCEDURE — 99999 PR PBB SHADOW E&M-EST. PATIENT-LVL IV: CPT | Mod: PBBFAC,,, | Performed by: ANESTHESIOLOGY

## 2023-03-08 PROCEDURE — 99999 PR PBB SHADOW E&M-EST. PATIENT-LVL IV: ICD-10-PCS | Mod: PBBFAC,,, | Performed by: ANESTHESIOLOGY

## 2023-03-08 PROCEDURE — 1125F AMNT PAIN NOTED PAIN PRSNT: CPT | Mod: CPTII,S$GLB,, | Performed by: ANESTHESIOLOGY

## 2023-03-08 PROCEDURE — 3074F PR MOST RECENT SYSTOLIC BLOOD PRESSURE < 130 MM HG: ICD-10-PCS | Mod: CPTII,S$GLB,, | Performed by: ANESTHESIOLOGY

## 2023-03-08 PROCEDURE — 1159F PR MEDICATION LIST DOCUMENTED IN MEDICAL RECORD: ICD-10-PCS | Mod: CPTII,S$GLB,, | Performed by: ANESTHESIOLOGY

## 2023-03-08 PROCEDURE — 1159F MED LIST DOCD IN RCRD: CPT | Mod: CPTII,S$GLB,, | Performed by: ANESTHESIOLOGY

## 2023-03-08 PROCEDURE — 3078F PR MOST RECENT DIASTOLIC BLOOD PRESSURE < 80 MM HG: ICD-10-PCS | Mod: CPTII,S$GLB,, | Performed by: ANESTHESIOLOGY

## 2023-03-08 PROCEDURE — 3074F SYST BP LT 130 MM HG: CPT | Mod: CPTII,S$GLB,, | Performed by: ANESTHESIOLOGY

## 2023-03-08 PROCEDURE — 1125F PR PAIN SEVERITY QUANTIFIED, PAIN PRESENT: ICD-10-PCS | Mod: CPTII,S$GLB,, | Performed by: ANESTHESIOLOGY

## 2023-03-08 PROCEDURE — 99214 PR OFFICE/OUTPT VISIT, EST, LEVL IV, 30-39 MIN: ICD-10-PCS | Mod: S$GLB,,, | Performed by: ANESTHESIOLOGY

## 2023-03-08 PROCEDURE — 3288F PR FALLS RISK ASSESSMENT DOCUMENTED: ICD-10-PCS | Mod: CPTII,S$GLB,, | Performed by: ANESTHESIOLOGY

## 2023-03-08 PROCEDURE — 1101F PR PT FALLS ASSESS DOC 0-1 FALLS W/OUT INJ PAST YR: ICD-10-PCS | Mod: CPTII,S$GLB,, | Performed by: ANESTHESIOLOGY

## 2023-03-08 NOTE — PROGRESS NOTES
-PCP: Anthony Tian MD    REFERRING PHYSICIAN: No ref. provider found    CHIEF COMPLAINT: Low back pain    Original HISTORY OF PRESENT ILLNESS: Piter Hernandez presents to the clinic for the evaluation of the above pain.     Of note, patient does have a history of Afib and prior-CVA and is currently on Eliquis.    Original Pain Description:  The pain is located in the low back, mostly on the right side, and does not radiate. The pain is described as aching, sharp, and stabbing. Exacerbating factors: Sitting, Standing, Bending, Walking, Extension, Flexing, and Lifting. Mitigating factors heat, ice, massage, and medications. Symptoms interfere with daily activity and sleeping. The patient feels like symptoms have been unchanged. Patient denies urinary incontinence, bowel incontinence, significant weight loss, significant motor weakness, and loss of sensations.    Original PAIN SCORES:  Best: Pain is 0  Worst: Pain is 9  Usually: Pain is 7  Current: Pain is 0      INTERVAL HISTORY 3/8/23:   Patient was referred to healthy back and advance imaging was ordered.   Lumbar radiographs flex/ex with disc space narrowing and endplate changes at every level.  Facet arthropathy most pronounced L4-5 and L5-S1.  AP alignment is anatomic.  Dextroconvex curvature lumbar spine.    Lumbar MRI obtained demonstrating the followin. Lumbar dextroscoliosis with superimposed degenerative changes contributing to mild-to-moderate neural foraminal narrowing from L2-L3 through L5-S1 as detailed above.  2. Prominent right degenerative facet edema at L5-S1.    Today he reports that he is not interested in therapy or the healthy back program as previous therapy efforts were ineffective at relief (last PT was 6 months ago per patient). He, instead, derives relief from biking, walking his dog. He does not take any oral medications for this condition and is interested in trying interventional procedures. Pain will radiate from low  "lumbar spine to right flank area, lateral and just inferior to iliac crest.         6 weeks of Conservative therapy (PT/Chiro/Home Exercises with Dates)  PT - "6 months ago"  Chiropractor - 2022- 2022  Acupuncture - 10 years ago  Massage - as often as possible  HEP - Does push ups, biking, walking, squats, and stretching daily at home for the past 2 years.     Treatments / Medications: (Ice/Heat/NSAIDS/APAP/etc):  Tramodol  Ambien  Heat/Ice  Norco (in the past from previous surgery)  Acetaminophen (upset stomach)     Report:  Reviewed and consistent with medication use as prescribed    Interventional Pain Procedures: (Previous injections)  Back injections 6 years ago and again 4 years ago - unsure of what was done but didn't help    Past Medical History:   Diagnosis Date    Anticoagulant long-term use     Arthritis     Cancer     Skin cancer--left ear    Hypertension     Squamous cell carcinoma of scalp 2023    mid frontal scalp    Squamous cell carcinoma of skin     Thyroid disease      Past Surgical History:   Procedure Laterality Date    COLONOSCOPY N/A 2016    Procedure: COLONOSCOPY Miralax split dose prep;  Surgeon: Luciano Mendoza Jr., MD;  [facility];  Service: Endoscopy;  Laterality: N/A;     Social History     Socioeconomic History    Marital status:    Tobacco Use    Smoking status: Former     Types: Cigarettes     Quit date: 1/10/1986     Years since quittin.1    Smokeless tobacco: Never   Substance and Sexual Activity    Alcohol use: Yes    Drug use: No     No family history on file.    Review of patient's allergies indicates:  No Known Allergies    Current Outpatient Medications   Medication Sig    albuterol (PROVENTIL/VENTOLIN HFA) 90 mcg/actuation inhaler Inhale 2 puffs every 6 (six) hours as needed into the lungs for Wheezing    alprazolam (XANAX) 0.25 MG tablet Take 0.25 mg by mouth 2 (two) times daily.    amoxicillin-clavulanate 875-125mg " (AUGMENTIN) 875-125 mg per tablet Take 1 tablet in the morning and 1 tablet before bedtime by mouth. Do all this for 10 days.    apixaban (ELIQUIS ORAL) Take by mouth.    APRISO 0.375 gram Cp24 TAKE 4 CAPSULES BY MOUTH DAILY    aspirin (ECOTRIN) 81 MG EC tablet Take 81 mg by mouth once daily.    atenolol (TENORMIN) 50 MG tablet Take 50 mg by mouth once daily.    azathioprine (IMURAN) 50 mg Tab TAKE 2 TABLETS DAILY    azithromycin (Z-KENISHA) 250 MG tablet TAKE BY MOUTH 2 TABS DAY 1 THEN 1 TAB DAILY DAYS 2 THROUGH 5 AS DIRECTED BY MD    chlorzoxazone (PARAFON FORTE) 500 mg Tab Take 250 mg by mouth 4 (four) times daily as needed.    clopidogreL (PLAVIX) 75 mg tablet Take 75 mg by mouth.    fenofibrate micronized (LOFIBRA) 134 MG Cap Take 134 mg by mouth daily with breakfast.    HYDROcodone-acetaminophen (NORCO) 5-325 mg per tablet Take 1 tablet by mouth every 6 (six) hours as needed for Pain.    mesalamine (CANASA) 1000 MG Supp Place 1 suppository (1,000 mg total) rectally nightly.    mupirocin (BACTROBAN) 2 % ointment Apply to affected area 3 times daily    rosuvastatin (CRESTOR) 10 MG tablet Take 10 mg by mouth once daily.    tramadol (ULTRAM) 50 mg tablet Take 50 mg by mouth Daily.    zolpidem (AMBIEN) 10 mg Tab Take 10 mg by mouth every evening.     No current facility-administered medications for this visit.     ROS:  GENERAL: No fever. No chills. No fatigue. Denies weight loss. Denies weight gain.  HEENT: Denies headaches. Denies vision change. Denies eye pain. Denies double vision. Denies ear pain.   CV: Denies chest pain.   PULM: Denies of shortness of breath.  GI: Denies constipation. No diarrhea. No abdominal pain. Denies nausea. Denies vomiting. No blood in stool.  HEME: Denies bleeding problems.  : Denies urgency. No painful urination. No blood in urine.  MS: Denies joint stiffness. Denies joint swelling.  Denies back pain.  SKIN: Denies rash.   NEURO: Denies seizures. No weakness.  PSYCH:  Denies difficulty  "sleeping. No anxiety. Denies depression. No suicidal thoughts.     VITALS:   Vitals:    03/08/23 0906   BP: 121/71   Pulse: 72   Resp: 19   Temp: 97.7 °F (36.5 °C)   TempSrc: Oral   Weight: 62.1 kg (136 lb 14.5 oz)   Height: 5' 7" (1.702 m)   PainSc:   6   PainLoc: Back       PHYSICAL EXAM:   GENERAL: Well appearing, in no acute distress, alert and oriented x3.  PSYCH:  Mood and affect appropriate.  SKIN: Skin color, texture, turgor normal, no rashes or lesions.  HEENT:  Normocephalic, atraumatic. Cranial nerves grossly intact.  PULM: No evidence of respiratory difficulty, symmetric chest rise.  GI:  Non-distended  BACK: Normal range of motion.  Patient reports pain with both flexion and extension, greater with flexion.  Facet loading is positive bilaterally.  Milgram's positive.  Femoral nerve stretch negative.  There is tenderness to palpation over the lower lumbar facet joints, more so on the right.  There is no pain with palpation over the sacroiliac joints bilaterally. Straight leg raising in the supine position is negative to radicular pain.   EXTREMITIES: No deformities, edema, or skin discoloration.   MUSCULOSKELETAL:  Logroll negative.  FADIR negative.  Tani's maneuver elicits pain across the low back bilaterally.  Gaenslen is negative.  Bilateral upper and lower extremity strength is normal and symmetric. No atrophy is noted.  NEURO: Sensation is equal and appropriate bilaterally. Bilateral upper and lower extremity coordination and muscle stretch reflexes are physiologic and symmetric.  GAIT: Normal.    IMAGING:      EXAMINATION:  MRI LUMBAR SPINE WITHOUT CONTRAST     CLINICAL HISTORY:  Low back pain, symptoms persist with > 6wks conservative treatment; Low back pain, unspecified     TECHNIQUE:  Multiplanar, multisequence MR images were acquired from the thoracolumbar junction to the sacrum without the administration of contrast.     COMPARISON:  Radiograph 02/08/2023.     FINDINGS:  Lumbar spine " alignment demonstrates dextroscoliosis with mild grade 1 retrolisthesis of L2 on L3 and mild grade 1 anterolisthesis of L5 on S1.  No spondylolysis.  Vertebral body heights are well maintained without evidence for fracture.  No marrow signal abnormality to suggest an infiltrative process.  Prominent right degenerative facet edema at L5-S1 with a posteriorly oriented synovial cyst.     Partial solid osseous interbody fusion at L3-L4.  There is degenerative disc desiccation throughout the remaining of the visualized thoracolumbar spine with mild-to-moderate height loss at L2-L3, L4-L5 and L5-S1.  Multilevel chronic degenerative endplate changes without significant superimposed edema.     Distal spinal cord demonstrates normal contour and signal intensity.  Cauda equina appears normal without findings to suggest arachnoiditis.  Conus medullaris terminates at T12-L1.     Dilated pancreatic duct measuring up to 4 mm.  Bilateral cortical renal cysts.  There are numerous colonic diverticuli.  SI joints are symmetric.  Mild patchy edema of the posterior paraspinal musculature.     T12-L1: No spinal canal stenosis.  No neural foraminal narrowing.     L1-L2: Circumferential disc bulge.  No spinal canal stenosis.  No neural foraminal narrowing.     L2-L3: Mild grade 1 retrolisthesis.  Circumferential disc bulge.  Bilateral facet arthropathy and bilateral ligamentum flavum buckling.  No spinal canal stenosis.  Moderate left and mild right neural foraminal narrowing.     L3-L4: Circumferential disc osteophyte complex.  Bilateral facet arthropathy and bilateral ligamentum flavum buckling.  No spinal canal stenosis.  Mild right and mild-to-moderate left neural foraminal narrowing.     L4-L5: Circumferential disc bulge.  Bilateral facet arthropathy and bilateral ligamentum flavum buckling.  Mild-to-moderate left lateral recess stenosis.  Moderate left and mild right neural foraminal narrowing.     L5-S1: Mild grade 1  anterolisthesis.  Circumferential disc bulge.  Bilateral facet arthropathy and bilateral ligamentum flavum buckling.  No spinal canal stenosis.  Moderate right neural foraminal narrowing.     Impression:     1. Lumbar dextroscoliosis with superimposed degenerative changes contributing to mild-to-moderate neural foraminal narrowing from L2-L3 through L5-S1 as detailed above.  2. Prominent right degenerative facet edema at L5-S1.  3. Mild pancreatic ductal dilatation, incompletely evaluated on this exam.  Recommend correlation with laboratory values and further workup with MRCP as clinically warranted.        Electronically signed by: Ben Leonard MD  Date:                                            02/23/2023  Time:                                           15:08    EXAMINATION:  XR LUMBAR SPINE 5 VIEW WITH FLEX AND EXT     CLINICAL HISTORY:  Other intervertebral disc degeneration, lumbar region     TECHNIQUE:  AP, lateral, and oblique images are performed through the lumbar spine.     COMPARISON:  None     FINDINGS:  Lumbar vertebral body heights are maintained.     Disc space narrowing and endplate changes at every level.  Facet arthropathy most pronounced L4-5 and L5-S1.     AP alignment is anatomic.  Dextroconvex curvature lumbar spine.     Aortic atherosclerosis.     Impression:     No acute osseous abnormality seen.  Degenerative change as above.        Electronically signed by: Elena Vanegas  Date:                                            02/08/2023  Time:                                           14:41    ASSESSMENT: 82 y.o. year old male with pain, consistent with:    Encounter Diagnoses   Name Primary?    Spondylolysis of lumbar region Yes    DDD (degenerative disc disease), lumbar     Spondylolisthesis of lumbar region     Lumbar facet arthropathy        DISCUSSION: Mr. Hernandez is a very nice octogenarian who comes to us with chronic low back pain, worse on the right side. Imaging shows multilevel  DDD with Modic changes around L5/S1. It also shows multilevel facet arthropathy with promient facet edema at L5/S1.     PLAN:  Cervical radiographs  Schedule for right L3, 4, 5 MBB x 2 and will proceed towards RFA if appropriate  Will discuss neck pain in the future as needed.    DARYL Puga MD  [facility], PGY-4

## 2023-03-08 NOTE — H&P (VIEW-ONLY)
-PCP: Anthony Tian MD    REFERRING PHYSICIAN: No ref. provider found    CHIEF COMPLAINT: Low back pain    Original HISTORY OF PRESENT ILLNESS: Piter Hernandez presents to the clinic for the evaluation of the above pain.     Of note, patient does have a history of Afib and prior-CVA and is currently on Eliquis.    Original Pain Description:  The pain is located in the low back, mostly on the right side, and does not radiate. The pain is described as aching, sharp, and stabbing. Exacerbating factors: Sitting, Standing, Bending, Walking, Extension, Flexing, and Lifting. Mitigating factors heat, ice, massage, and medications. Symptoms interfere with daily activity and sleeping. The patient feels like symptoms have been unchanged. Patient denies urinary incontinence, bowel incontinence, significant weight loss, significant motor weakness, and loss of sensations.    Original PAIN SCORES:  Best: Pain is 0  Worst: Pain is 9  Usually: Pain is 7  Current: Pain is 0      INTERVAL HISTORY 3/8/23:   Patient was referred to healthy back and advance imaging was ordered.   Lumbar radiographs flex/ex with disc space narrowing and endplate changes at every level.  Facet arthropathy most pronounced L4-5 and L5-S1.  AP alignment is anatomic.  Dextroconvex curvature lumbar spine.    Lumbar MRI obtained demonstrating the followin. Lumbar dextroscoliosis with superimposed degenerative changes contributing to mild-to-moderate neural foraminal narrowing from L2-L3 through L5-S1 as detailed above.  2. Prominent right degenerative facet edema at L5-S1.    Today he reports that he is not interested in therapy or the healthy back program as previous therapy efforts were ineffective at relief (last PT was 6 months ago per patient). He, instead, derives relief from biking, walking his dog. He does not take any oral medications for this condition and is interested in trying interventional procedures. Pain will radiate from low  "lumbar spine to right flank area, lateral and just inferior to iliac crest.         6 weeks of Conservative therapy (PT/Chiro/Home Exercises with Dates)  PT - "6 months ago"  Chiropractor - 2022- 2022  Acupuncture - 10 years ago  Massage - as often as possible  HEP - Does push ups, biking, walking, squats, and stretching daily at home for the past 2 years.     Treatments / Medications: (Ice/Heat/NSAIDS/APAP/etc):  Tramodol  Ambien  Heat/Ice  Norco (in the past from previous surgery)  Acetaminophen (upset stomach)     Report:  Reviewed and consistent with medication use as prescribed    Interventional Pain Procedures: (Previous injections)  Back injections 6 years ago and again 4 years ago - unsure of what was done but didn't help    Past Medical History:   Diagnosis Date    Anticoagulant long-term use     Arthritis     Cancer     Skin cancer--left ear    Hypertension     Squamous cell carcinoma of scalp 2023    mid frontal scalp    Squamous cell carcinoma of skin     Thyroid disease      Past Surgical History:   Procedure Laterality Date    COLONOSCOPY N/A 2016    Procedure: COLONOSCOPY Miralax split dose prep;  Surgeon: Luciano Mendoza Jr., MD;  Location: [facility];  Service: Endoscopy;  Laterality: N/A;     Social History     Socioeconomic History    Marital status:    Tobacco Use    Smoking status: Former     Types: Cigarettes     Quit date: 1/10/1986     Years since quittin.1    Smokeless tobacco: Never   Substance and Sexual Activity    Alcohol use: Yes    Drug use: No     No family history on file.    Review of patient's allergies indicates:  No Known Allergies    Current Outpatient Medications   Medication Sig    albuterol (PROVENTIL/VENTOLIN HFA) 90 mcg/actuation inhaler Inhale 2 puffs every 6 (six) hours as needed into the lungs for Wheezing    alprazolam (XANAX) 0.25 MG tablet Take 0.25 mg by mouth 2 (two) times daily.    amoxicillin-clavulanate 875-125mg " (AUGMENTIN) 875-125 mg per tablet Take 1 tablet in the morning and 1 tablet before bedtime by mouth. Do all this for 10 days.    apixaban (ELIQUIS ORAL) Take by mouth.    APRISO 0.375 gram Cp24 TAKE 4 CAPSULES BY MOUTH DAILY    aspirin (ECOTRIN) 81 MG EC tablet Take 81 mg by mouth once daily.    atenolol (TENORMIN) 50 MG tablet Take 50 mg by mouth once daily.    azathioprine (IMURAN) 50 mg Tab TAKE 2 TABLETS DAILY    azithromycin (Z-KENISHA) 250 MG tablet TAKE BY MOUTH 2 TABS DAY 1 THEN 1 TAB DAILY DAYS 2 THROUGH 5 AS DIRECTED BY MD    chlorzoxazone (PARAFON FORTE) 500 mg Tab Take 250 mg by mouth 4 (four) times daily as needed.    clopidogreL (PLAVIX) 75 mg tablet Take 75 mg by mouth.    fenofibrate micronized (LOFIBRA) 134 MG Cap Take 134 mg by mouth daily with breakfast.    HYDROcodone-acetaminophen (NORCO) 5-325 mg per tablet Take 1 tablet by mouth every 6 (six) hours as needed for Pain.    mesalamine (CANASA) 1000 MG Supp Place 1 suppository (1,000 mg total) rectally nightly.    mupirocin (BACTROBAN) 2 % ointment Apply to affected area 3 times daily    rosuvastatin (CRESTOR) 10 MG tablet Take 10 mg by mouth once daily.    tramadol (ULTRAM) 50 mg tablet Take 50 mg by mouth Daily.    zolpidem (AMBIEN) 10 mg Tab Take 10 mg by mouth every evening.     No current facility-administered medications for this visit.     ROS:  GENERAL: No fever. No chills. No fatigue. Denies weight loss. Denies weight gain.  HEENT: Denies headaches. Denies vision change. Denies eye pain. Denies double vision. Denies ear pain.   CV: Denies chest pain.   PULM: Denies of shortness of breath.  GI: Denies constipation. No diarrhea. No abdominal pain. Denies nausea. Denies vomiting. No blood in stool.  HEME: Denies bleeding problems.  : Denies urgency. No painful urination. No blood in urine.  MS: Denies joint stiffness. Denies joint swelling.  Denies back pain.  SKIN: Denies rash.   NEURO: Denies seizures. No weakness.  PSYCH:  Denies difficulty  "sleeping. No anxiety. Denies depression. No suicidal thoughts.     VITALS:   Vitals:    03/08/23 0906   BP: 121/71   Pulse: 72   Resp: 19   Temp: 97.7 °F (36.5 °C)   TempSrc: Oral   Weight: 62.1 kg (136 lb 14.5 oz)   Height: 5' 7" (1.702 m)   PainSc:   6   PainLoc: Back       PHYSICAL EXAM:   GENERAL: Well appearing, in no acute distress, alert and oriented x3.  PSYCH:  Mood and affect appropriate.  SKIN: Skin color, texture, turgor normal, no rashes or lesions.  HEENT:  Normocephalic, atraumatic. Cranial nerves grossly intact.  PULM: No evidence of respiratory difficulty, symmetric chest rise.  GI:  Non-distended  BACK: Normal range of motion.  Patient reports pain with both flexion and extension, greater with flexion.  Facet loading is positive bilaterally.  Milgram's positive.  Femoral nerve stretch negative.  There is tenderness to palpation over the lower lumbar facet joints, more so on the right.  There is no pain with palpation over the sacroiliac joints bilaterally. Straight leg raising in the supine position is negative to radicular pain.   EXTREMITIES: No deformities, edema, or skin discoloration.   MUSCULOSKELETAL:  Logroll negative.  FADIR negative.  Tani's maneuver elicits pain across the low back bilaterally.  Gaenslen is negative.  Bilateral upper and lower extremity strength is normal and symmetric. No atrophy is noted.  NEURO: Sensation is equal and appropriate bilaterally. Bilateral upper and lower extremity coordination and muscle stretch reflexes are physiologic and symmetric.  GAIT: Normal.    IMAGING:      EXAMINATION:  MRI LUMBAR SPINE WITHOUT CONTRAST     CLINICAL HISTORY:  Low back pain, symptoms persist with > 6wks conservative treatment; Low back pain, unspecified     TECHNIQUE:  Multiplanar, multisequence MR images were acquired from the thoracolumbar junction to the sacrum without the administration of contrast.     COMPARISON:  Radiograph 02/08/2023.     FINDINGS:  Lumbar spine " alignment demonstrates dextroscoliosis with mild grade 1 retrolisthesis of L2 on L3 and mild grade 1 anterolisthesis of L5 on S1.  No spondylolysis.  Vertebral body heights are well maintained without evidence for fracture.  No marrow signal abnormality to suggest an infiltrative process.  Prominent right degenerative facet edema at L5-S1 with a posteriorly oriented synovial cyst.     Partial solid osseous interbody fusion at L3-L4.  There is degenerative disc desiccation throughout the remaining of the visualized thoracolumbar spine with mild-to-moderate height loss at L2-L3, L4-L5 and L5-S1.  Multilevel chronic degenerative endplate changes without significant superimposed edema.     Distal spinal cord demonstrates normal contour and signal intensity.  Cauda equina appears normal without findings to suggest arachnoiditis.  Conus medullaris terminates at T12-L1.     Dilated pancreatic duct measuring up to 4 mm.  Bilateral cortical renal cysts.  There are numerous colonic diverticuli.  SI joints are symmetric.  Mild patchy edema of the posterior paraspinal musculature.     T12-L1: No spinal canal stenosis.  No neural foraminal narrowing.     L1-L2: Circumferential disc bulge.  No spinal canal stenosis.  No neural foraminal narrowing.     L2-L3: Mild grade 1 retrolisthesis.  Circumferential disc bulge.  Bilateral facet arthropathy and bilateral ligamentum flavum buckling.  No spinal canal stenosis.  Moderate left and mild right neural foraminal narrowing.     L3-L4: Circumferential disc osteophyte complex.  Bilateral facet arthropathy and bilateral ligamentum flavum buckling.  No spinal canal stenosis.  Mild right and mild-to-moderate left neural foraminal narrowing.     L4-L5: Circumferential disc bulge.  Bilateral facet arthropathy and bilateral ligamentum flavum buckling.  Mild-to-moderate left lateral recess stenosis.  Moderate left and mild right neural foraminal narrowing.     L5-S1: Mild grade 1  anterolisthesis.  Circumferential disc bulge.  Bilateral facet arthropathy and bilateral ligamentum flavum buckling.  No spinal canal stenosis.  Moderate right neural foraminal narrowing.     Impression:     1. Lumbar dextroscoliosis with superimposed degenerative changes contributing to mild-to-moderate neural foraminal narrowing from L2-L3 through L5-S1 as detailed above.  2. Prominent right degenerative facet edema at L5-S1.  3. Mild pancreatic ductal dilatation, incompletely evaluated on this exam.  Recommend correlation with laboratory values and further workup with MRCP as clinically warranted.        Electronically signed by: Ben Leonard MD  Date:                                            02/23/2023  Time:                                           15:08    EXAMINATION:  XR LUMBAR SPINE 5 VIEW WITH FLEX AND EXT     CLINICAL HISTORY:  Other intervertebral disc degeneration, lumbar region     TECHNIQUE:  AP, lateral, and oblique images are performed through the lumbar spine.     COMPARISON:  None     FINDINGS:  Lumbar vertebral body heights are maintained.     Disc space narrowing and endplate changes at every level.  Facet arthropathy most pronounced L4-5 and L5-S1.     AP alignment is anatomic.  Dextroconvex curvature lumbar spine.     Aortic atherosclerosis.     Impression:     No acute osseous abnormality seen.  Degenerative change as above.        Electronically signed by: Elena Vanegas  Date:                                            02/08/2023  Time:                                           14:41    ASSESSMENT: 82 y.o. year old male with pain, consistent with:    Encounter Diagnoses   Name Primary?    Spondylolysis of lumbar region Yes    DDD (degenerative disc disease), lumbar     Spondylolisthesis of lumbar region     Lumbar facet arthropathy        DISCUSSION: Mr. Hernandez is a very nice octogenarian who comes to us with chronic low back pain, worse on the right side. Imaging shows multilevel  DDD with Modic changes around L5/S1. It also shows multilevel facet arthropathy with promient facet edema at L5/S1.     PLAN:  Cervical radiographs  Schedule for right L3, 4, 5 MBB x 2 and will proceed towards RFA if appropriate  Will discuss neck pain in the future as needed.    DARYL Puga MD  [facility], PGY-4

## 2023-03-20 ENCOUNTER — HOSPITAL ENCOUNTER (OUTPATIENT)
Dept: RADIOLOGY | Facility: HOSPITAL | Age: 83
Discharge: HOME | End: 2023-03-20
Attending: STUDENT IN AN ORGANIZED HEALTH CARE EDUCATION/TRAINING PROGRAM
Payer: MEDICARE

## 2023-03-20 DIAGNOSIS — M54.2 CERVICALGIA: ICD-10-CM

## 2023-03-20 PROCEDURE — 72052 X-RAY EXAM NECK SPINE 6/>VWS: CPT | Mod: TC,PO

## 2023-03-20 PROCEDURE — 72052 X-RAY EXAM NECK SPINE 6/>VWS: CPT | Mod: 26,,, | Performed by: RADIOLOGY

## 2023-03-20 PROCEDURE — 72052 XR CERVICAL SPINE 5 VIEW WITH FLEX AND EXT: ICD-10-PCS | Mod: 26,,, | Performed by: RADIOLOGY

## 2023-03-24 ENCOUNTER — TELEPHONE (OUTPATIENT)
Dept: DERMATOLOGY | Facility: CLINIC | Age: 83
End: 2023-03-24
Payer: MEDICARE

## 2023-03-24 NOTE — TELEPHONE ENCOUNTER
----- Message from Lorena Lino sent at 3/24/2023  1:06 PM CDT -----  Regarding: question  Contact: @ [phone]  Pt is calling to see if he can have the doctor call him he has some question he wants to ask to see if a certain procedure can be done please call and adv @ [phone]

## 2023-03-27 ENCOUNTER — TELEPHONE (OUTPATIENT)
Dept: DERMATOLOGY | Facility: CLINIC | Age: 83
End: 2023-03-27
Payer: MEDICARE

## 2023-03-27 NOTE — TELEPHONE ENCOUNTER
----- Message from Arabella Stallings sent at 3/27/2023  1:16 PM CDT -----  Contact: pt  Pt requesting call back RE: would like to speak in regards to bump on his head that he would like to get removed if possible, he states he has been putting a warm compress on it like instructed and it has not gone down      Confirmed contact below:  Contact Name:Piter Hernandez  Phone Number: [phone]

## 2023-03-27 NOTE — TELEPHONE ENCOUNTER
Pt called with large blood blister on scalp, states not painful, very tough, about size of jasmin. Pt coming in next week 4/4 2:00pm for w/c of finger, will check scalp then and try to nick & drain. Pt expressed understanding.

## 2023-03-28 ENCOUNTER — HOSPITAL ENCOUNTER (OUTPATIENT)
Facility: OTHER | Age: 83
Discharge: HOME | End: 2023-03-28
Attending: ANESTHESIOLOGY | Admitting: ANESTHESIOLOGY
Payer: MEDICARE

## 2023-03-28 VITALS
RESPIRATION RATE: 16 BRPM | WEIGHT: 147 LBS | TEMPERATURE: 98 F | OXYGEN SATURATION: 98 % | HEART RATE: 52 BPM | BODY MASS INDEX: 23.07 KG/M2 | SYSTOLIC BLOOD PRESSURE: 167 MMHG | HEIGHT: 67 IN | DIASTOLIC BLOOD PRESSURE: 76 MMHG

## 2023-03-28 DIAGNOSIS — M47.816 LUMBAR FACET ARTHROPATHY: Primary | ICD-10-CM

## 2023-03-28 DIAGNOSIS — G89.29 CHRONIC PAIN: ICD-10-CM

## 2023-03-28 PROCEDURE — 64493 INJ PARAVERT F JNT L/S 1 LEV: CPT | Mod: KX,RT,, | Performed by: ANESTHESIOLOGY

## 2023-03-28 PROCEDURE — 64493 PR INJ DX/THER AGNT PARAVERT FACET JOINT,IMG GUIDE,LUMBAR/SAC,1ST LVL: ICD-10-PCS | Mod: KX,RT,, | Performed by: ANESTHESIOLOGY

## 2023-03-28 PROCEDURE — 64494 PR INJ DX/THER AGNT PARAVERT FACET JOINT,IMG GUIDE,LUMBAR/SAC, 2ND LEVEL: ICD-10-PCS | Mod: KX,RT,, | Performed by: ANESTHESIOLOGY

## 2023-03-28 PROCEDURE — 64494 INJ PARAVERT F JNT L/S 2 LEV: CPT | Mod: KX,RT,, | Performed by: ANESTHESIOLOGY

## 2023-03-28 PROCEDURE — 64494 INJ PARAVERT F JNT L/S 2 LEV: CPT | Mod: KX,RT | Performed by: ANESTHESIOLOGY

## 2023-03-28 PROCEDURE — 64493 INJ PARAVERT F JNT L/S 1 LEV: CPT | Mod: KX,RT | Performed by: ANESTHESIOLOGY

## 2023-03-28 PROCEDURE — 25000003 PHARM REV CODE 250: Performed by: ANESTHESIOLOGY

## 2023-03-28 RX ORDER — SODIUM CHLORIDE 9 MG/ML
500 INJECTION, SOLUTION INTRAVENOUS CONTINUOUS
Status: DISCONTINUED | OUTPATIENT
Start: 2023-03-28 | End: 2023-03-28 | Stop reason: HOSPADM

## 2023-03-28 RX ORDER — BUPIVACAINE HYDROCHLORIDE 2.5 MG/ML
INJECTION, SOLUTION EPIDURAL; INFILTRATION; INTRACAUDAL
Status: DISCONTINUED | OUTPATIENT
Start: 2023-03-28 | End: 2023-03-28 | Stop reason: HOSPADM

## 2023-03-28 RX ORDER — LIDOCAINE HYDROCHLORIDE 20 MG/ML
INJECTION, SOLUTION INFILTRATION; PERINEURAL
Status: DISCONTINUED | OUTPATIENT
Start: 2023-03-28 | End: 2023-03-28 | Stop reason: HOSPADM

## 2023-03-28 NOTE — DISCHARGE SUMMARY
Discharge Note  Short Stay      SUMMARY     Admit Date: 3/28/2023    Attending Physician: Megha Magana      Discharge Physician: Megha Magana      Discharge Date: 3/28/2023 2:54 PM    Procedure(s) (LRB):  BLOCK, NERVE RIGHT L3,L4,L5 MEDIAL BRANCH (Right)    Final Diagnosis: Spondylolysis of lumbar region [M43.06]  DDD (degenerative disc disease), lumbar [M51.36]  Lumbar facet arthropathy [M47.816]    Disposition: Home or self care    Patient Instructions:   Current Discharge Medication List        CONTINUE these medications which have NOT CHANGED    Details   apixaban (ELIQUIS ORAL) Take by mouth.      clopidogreL (PLAVIX) 75 mg tablet Take 75 mg by mouth.      albuterol (PROVENTIL/VENTOLIN HFA) 90 mcg/actuation inhaler Inhale 2 puffs every 6 (six) hours as needed into the lungs for Wheezing      alprazolam (XANAX) 0.25 MG tablet Take 0.25 mg by mouth 2 (two) times daily.  Refills: 3      amoxicillin-clavulanate 875-125mg (AUGMENTIN) 875-125 mg per tablet Take 1 tablet in the morning and 1 tablet before bedtime by mouth. Do all this for 10 days.      APRISO 0.375 gram Cp24 TAKE 4 CAPSULES BY MOUTH DAILY  Qty: 120 capsule, Refills: 3    Comments: This prescription was filled on 9/13/2017. Any refills authorized will be placed on file.      aspirin (ECOTRIN) 81 MG EC tablet Take 81 mg by mouth once daily.      atenolol (TENORMIN) 50 MG tablet Take 50 mg by mouth once daily.      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      fenofibrate micronized (LOFIBRA) 134 MG Cap Take 134 mg by mouth daily with breakfast.      HYDROcodone-acetaminophen (NORCO) 5-325 mg per tablet Take 1 tablet by  mouth every 6 (six) hours as needed for Pain.  Qty: 10 tablet, Refills: 0    Comments: Quantity prescribed more than 7 day supply? No  Associated Diagnoses: Squamous cell carcinoma of scalp      mesalamine (CANASA) 1000 MG Supp Place 1 suppository (1,000 mg total) rectally nightly.  Qty: 90 suppository, Refills: 3      mupirocin (BACTROBAN) 2 % ointment Apply to affected area 3 times daily  Qty: 22 g, Refills: 1      rosuvastatin (CRESTOR) 10 MG tablet Take 10 mg by mouth once daily.      tramadol (ULTRAM) 50 mg tablet Take 50 mg by mouth Daily.  Refills: 0      zolpidem (AMBIEN) 10 mg Tab Take 10 mg by mouth every evening.  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  Activity: as per instruction sheet.  Discharge to: Home with a responsible adult.  Follow up: 2-4 weeks       Please call my office or pager at [phone] if experienced any weakness or loss of sensation, fever > 101.5, pain uncontrolled with oral medications, persistent nausea/vomiting/or diarrhea, redness or drainage from the incisions, or any other worrisome concerns. If physician on call was not reached or could not communicate with our office for any reason please go to the nearest emergency department      Megha Magana  03/28/2023

## 2023-03-28 NOTE — DISCHARGE INSTRUCTIONS

## 2023-03-28 NOTE — OP NOTE
Diagnostic Lumbar Medial Branch Block Under Fluoroscopy    The procedure, risks, benefits, and options were discussed with the patient. There are no contraindications to the procedure. The patent expressed understanding and agreed to the procedure. Informed written consent was obtained prior to the start of the procedure and can be found in the patient's chart.    PATIENT NAME: Mr. Piter Hernandez   MRN: [id number]     DATE OF PROCEDURE: 03/28/2023                                           PROCEDURE:  Diagnostic Right L3, L4, and L5 Lumbar Medial Branch Block under Fluoroscopy    PRE-OP DIAGNOSIS: Spondylolysis of lumbar region [M43.06]  DDD (degenerative disc disease), lumbar [M51.36]  Lumbar facet arthropathy [M47.816] Lumbar spondylosis [M47.816]    POST-OP DIAGNOSIS: Same    PHYSICIAN: Megha Magana MD    ASSISTANTS: Shannan Ward    MEDICATIONS INJECTED:  Bupivicaine 0.25%    LOCAL ANESTHETIC INJECTED:   Xylocaine 2%    SEDATION: None    ESTIMATED BLOOD LOSS:  None    COMPLICATIONS:  None.    INTERVAL HISTORY: Patient has clinical and imaging findings suggestive of facet mediated pain.    TECHNIQUE: Time-out was performed to identify the patient and procedure to be performed. With the patient laying in a prone position, the surgical area was prepped and draped in the usual sterile fashion using ChloraPrep and fenestrated drape. The levels were determined under fluoroscopic guidance. Skin anesthesia was achieved by injecting Lidocaine 2% over the injection sites. A 22 gauge, 3.5 inch needle was introduced into the medial branch nerves at the junctions of the superior articular process and the transverse processes of the targeted sites using AP, lateral and/or contralateral oblique fluoroscopic imaging. After negative aspiration for blood or CSF was confirmed, 2 mL of the anesthetic listed above was then slowly injected at each site. The needles were removed and bleeding was nil. A sterile dressing was  applied. No specimens collected. The patient tolerated the procedure well.     The patient was monitored after the procedure in the recovery area. They were given post-procedure and discharge instructions to follow at home. The patient was discharged in a stable condition.    Megha Magana MD

## 2023-03-29 ENCOUNTER — TELEPHONE (OUTPATIENT)
Dept: PAIN MEDICINE | Facility: CLINIC | Age: 83
End: 2023-03-29

## 2023-03-29 NOTE — TELEPHONE ENCOUNTER
----- Message from Ryilya Yepez sent at 3/29/2023 11:24 AM CDT -----  Hour 1  - 100%           Hour 2   - 100%           Hour 3   - 100%           Hour 4 - 100%         Hour 5   - 100%           Hour 6   - 100%           Hour 8- 100%           Bedtime- 100%    PT stated that there is no pain at all in regards to after his surgery. PT stated that he did lawn work this past weekend as well.  PT also stated that he walked an dog and rode an exercise bike.Please contact to further discuss and advise.  HE SAID THANKS TO  THE WHOLE OFFICE!!!!!!!!!!

## 2023-04-04 ENCOUNTER — TELEPHONE (OUTPATIENT)
Dept: PAIN MEDICINE | Facility: CLINIC | Age: 83
End: 2023-04-04
Payer: MEDICARE

## 2023-04-04 ENCOUNTER — OFFICE VISIT (OUTPATIENT)
Dept: DERMATOLOGY | Facility: CLINIC | Age: 83
End: 2023-04-04
Payer: MEDICARE

## 2023-04-04 DIAGNOSIS — D04.62 SQUAMOUS CELL CARCINOMA IN SITU OF SKIN OF FINGER OF LEFT HAND: ICD-10-CM

## 2023-04-04 DIAGNOSIS — C44.42 SQUAMOUS CELL CARCINOMA OF SCALP: Primary | ICD-10-CM

## 2023-04-04 PROCEDURE — 1126F AMNT PAIN NOTED NONE PRSNT: CPT | Mod: CPTII,S$GLB,, | Performed by: DERMATOLOGY

## 2023-04-04 PROCEDURE — 3288F PR FALLS RISK ASSESSMENT DOCUMENTED: ICD-10-PCS | Mod: CPTII,S$GLB,, | Performed by: DERMATOLOGY

## 2023-04-04 PROCEDURE — 1101F PT FALLS ASSESS-DOCD LE1/YR: CPT | Mod: CPTII,S$GLB,, | Performed by: DERMATOLOGY

## 2023-04-04 PROCEDURE — 1159F PR MEDICATION LIST DOCUMENTED IN MEDICAL RECORD: ICD-10-PCS | Mod: CPTII,S$GLB,, | Performed by: DERMATOLOGY

## 2023-04-04 PROCEDURE — 3288F FALL RISK ASSESSMENT DOCD: CPT | Mod: CPTII,S$GLB,, | Performed by: DERMATOLOGY

## 2023-04-04 PROCEDURE — 99213 OFFICE O/P EST LOW 20 MIN: CPT | Mod: S$GLB,,, | Performed by: DERMATOLOGY

## 2023-04-04 PROCEDURE — 1159F MED LIST DOCD IN RCRD: CPT | Mod: CPTII,S$GLB,, | Performed by: DERMATOLOGY

## 2023-04-04 PROCEDURE — 99999 PR PBB SHADOW E&M-EST. PATIENT-LVL III: ICD-10-PCS | Mod: PBBFAC,,, | Performed by: DERMATOLOGY

## 2023-04-04 PROCEDURE — 1126F PR PAIN SEVERITY QUANTIFIED, NO PAIN PRESENT: ICD-10-PCS | Mod: CPTII,S$GLB,, | Performed by: DERMATOLOGY

## 2023-04-04 PROCEDURE — 99213 PR OFFICE/OUTPT VISIT, EST, LEVL III, 20-29 MIN: ICD-10-PCS | Mod: S$GLB,,, | Performed by: DERMATOLOGY

## 2023-04-04 PROCEDURE — 1160F PR REVIEW ALL MEDS BY PRESCRIBER/CLIN PHARMACIST DOCUMENTED: ICD-10-PCS | Mod: CPTII,S$GLB,, | Performed by: DERMATOLOGY

## 2023-04-04 PROCEDURE — 1101F PR PT FALLS ASSESS DOC 0-1 FALLS W/OUT INJ PAST YR: ICD-10-PCS | Mod: CPTII,S$GLB,, | Performed by: DERMATOLOGY

## 2023-04-04 PROCEDURE — 99999 PR PBB SHADOW E&M-EST. PATIENT-LVL III: CPT | Mod: PBBFAC,,, | Performed by: DERMATOLOGY

## 2023-04-04 PROCEDURE — 1160F RVW MEDS BY RX/DR IN RCRD: CPT | Mod: CPTII,S$GLB,, | Performed by: DERMATOLOGY

## 2023-04-04 NOTE — TELEPHONE ENCOUNTER
----- Message from Milagros Montesinos sent at 4/4/2023  9:32 AM CDT -----  Regarding: Patient call back  Who called:ARNOLD CASTLE [[id number]]    What is the request in detail: Patient is requesting a call back. He states the shots he just received seem to be working pretty well, but his neck is still causing issues. He would like to know if his treatment can start moving toward his neck.   Please advise.    Can the clinic reply by [facility]? No    Best call back number: [phone]    Additional Information: N/A

## 2023-04-04 NOTE — PROGRESS NOTES
82 y.o. male patient is here for wound check after surgery.    Patient reports no problems.  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  Occluded with saran wrap at night for 10 days.     WOUND PE:  The mid frontal scalp incision is well healed. Mild firmness and erythema of scar. No nodularity.  Left mid forehead with freely mobile 1.5 cm subcutaneous nodule c/w cyst.     Left 3rd finger with mild erythema, completely blanchable, no scale or nodularity.     Post efudex    IMPRESSION:  Healing operative site from Mohs' surgery SCC, mid frontal scalp s/p Mohs with CLC, postop day week #7, well healed and no evidence of recurrence.   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     PLAN:    Disc left 3rd finger with mild erythema c/w postop inflammation as completely blanches and without scale or nodularity  Disc post-treatment biopsy vs observation.  Pt will observe for now.  Disc need for regular tumor surveillance - rec f/u with Dr Alvarez for recheck in 2-3 months, will send Dr. Alvarez letter on this.   Pt also to f/u with Dr Alvarez for cyst left forehead removal.   Daily SPF.  Regular skin checks.    RTC:  In 2-3 months with Bhavik Alvarez M.D. for skin check or sooner if new concern arises.

## 2023-04-04 NOTE — Clinical Note
Luisa Hunt, Can you send letter to Dr. Bhavik Alvarez on this patient for me?  This patient saw Dr. Dharmesh Serra for a skin lesion on his distal left 3rd finger.  A biopsy was done consistent with squamous cell carcinoma in situ.  We treated this area with topical 5% 5-fluorouracil, with occlusion at night for 10 of the 21-day treatment.  He returns with mild blanchable erythema consistent with postop inflammation, and clinically there is significant improvement and no obvious evidence of residual disease.  The patient elected to observe the area for now, and I recommend he see you in 3 months to recheck the site.  Please let me know if there are any questions.   Thanks!

## 2023-04-11 ENCOUNTER — HOSPITAL ENCOUNTER (OUTPATIENT)
Facility: OTHER | Age: 83
Discharge: HOME | End: 2023-04-11
Attending: ANESTHESIOLOGY | Admitting: ANESTHESIOLOGY
Payer: MEDICARE

## 2023-04-11 VITALS
HEIGHT: 67 IN | SYSTOLIC BLOOD PRESSURE: 181 MMHG | DIASTOLIC BLOOD PRESSURE: 79 MMHG | RESPIRATION RATE: 16 BRPM | BODY MASS INDEX: 21.97 KG/M2 | TEMPERATURE: 98 F | OXYGEN SATURATION: 93 % | HEART RATE: 58 BPM | WEIGHT: 140 LBS

## 2023-04-11 DIAGNOSIS — M50.30 DDD (DEGENERATIVE DISC DISEASE), CERVICAL: ICD-10-CM

## 2023-04-11 DIAGNOSIS — G89.29 CHRONIC PAIN: ICD-10-CM

## 2023-04-11 DIAGNOSIS — M43.06 SPONDYLOLYSIS OF LUMBAR REGION: Primary | ICD-10-CM

## 2023-04-11 PROCEDURE — 64493 INJ PARAVERT F JNT L/S 1 LEV: CPT | Mod: RT | Performed by: ANESTHESIOLOGY

## 2023-04-11 PROCEDURE — 64493 PR INJ DX/THER AGNT PARAVERT FACET JOINT,IMG GUIDE,LUMBAR/SAC,1ST LVL: ICD-10-PCS | Mod: RT,,, | Performed by: ANESTHESIOLOGY

## 2023-04-11 PROCEDURE — 64494 INJ PARAVERT F JNT L/S 2 LEV: CPT | Mod: RT | Performed by: ANESTHESIOLOGY

## 2023-04-11 PROCEDURE — 64493 INJ PARAVERT F JNT L/S 1 LEV: CPT | Mod: RT,,, | Performed by: ANESTHESIOLOGY

## 2023-04-11 PROCEDURE — 64494 PR INJ DX/THER AGNT PARAVERT FACET JOINT,IMG GUIDE,LUMBAR/SAC, 2ND LEVEL: ICD-10-PCS | Mod: RT,,, | Performed by: ANESTHESIOLOGY

## 2023-04-11 PROCEDURE — 25000003 PHARM REV CODE 250: Performed by: ANESTHESIOLOGY

## 2023-04-11 PROCEDURE — 64494 INJ PARAVERT F JNT L/S 2 LEV: CPT | Mod: RT,,, | Performed by: ANESTHESIOLOGY

## 2023-04-11 RX ORDER — LIDOCAINE HYDROCHLORIDE 20 MG/ML
INJECTION, SOLUTION INFILTRATION; PERINEURAL
Status: DISCONTINUED | OUTPATIENT
Start: 2023-04-11 | End: 2023-04-11 | Stop reason: HOSPADM

## 2023-04-11 RX ORDER — BUPIVACAINE HYDROCHLORIDE 2.5 MG/ML
INJECTION, SOLUTION EPIDURAL; INFILTRATION; INTRACAUDAL
Status: DISCONTINUED | OUTPATIENT
Start: 2023-04-11 | End: 2023-04-11 | Stop reason: HOSPADM

## 2023-04-11 RX ORDER — SODIUM CHLORIDE 9 MG/ML
500 INJECTION, SOLUTION INTRAVENOUS CONTINUOUS
Status: DISCONTINUED | OUTPATIENT
Start: 2023-04-11 | End: 2023-04-11 | Stop reason: HOSPADM

## 2023-04-11 NOTE — DISCHARGE INSTRUCTIONS

## 2023-04-11 NOTE — DISCHARGE SUMMARY
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  Refills: 3      amoxicillin-clavulanate 875-125mg (AUGMENTIN) 875-125 mg per tablet Take 1 tablet in the morning and 1 tablet before bedtime by mouth. Do all this for 10 days.      apixaban (ELIQUIS ORAL) Take by mouth.      APRISO 0.375 gram Cp24 TAKE 4 CAPSULES BY MOUTH DAILY  Qty: 120 capsule, Refills: 3    Comments: This prescription was filled on 9/13/2017. Any refills authorized will be placed on file.      aspirin (ECOTRIN) 81 MG EC tablet Take 81 mg by mouth once daily.      atenolol (TENORMIN) 50 MG tablet Take 50 mg by mouth once daily.      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      clopidogreL (PLAVIX) 75 mg tablet Take 75 mg by mouth.      fenofibrate micronized (LOFIBRA) 134 MG Cap Take 134 mg by mouth daily with breakfast.      HYDROcodone-acetaminophen (NORCO) 5-325 mg per tablet Take 1 tablet by  mouth every 6 (six) hours as needed for Pain.  Qty: 10 tablet, Refills: 0    Comments: Quantity prescribed more than 7 day supply? No  Associated Diagnoses: Squamous cell carcinoma of scalp      mesalamine (CANASA) 1000 MG Supp Place 1 suppository (1,000 mg total) rectally nightly.  Qty: 90 suppository, Refills: 3      mupirocin (BACTROBAN) 2 % ointment Apply to affected area 3 times daily  Qty: 22 g, Refills: 1      rosuvastatin (CRESTOR) 10 MG tablet Take 10 mg by mouth once daily.      tramadol (ULTRAM) 50 mg tablet Take 50 mg by mouth Daily.  Refills: 0      zolpidem (AMBIEN) 10 mg Tab Take 10 mg by mouth every evening.  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  Activity: as per instruction sheet.  Discharge to: Home with a responsible adult.  Follow up: 2-4 weeks       Please call my office or pager at [phone] if experienced any weakness or loss of sensation, fever > 101.5, pain uncontrolled with oral medications, persistent nausea/vomiting/or diarrhea, redness or drainage from the incisions, or any other worrisome concerns. If physician on call was not reached or could not communicate with our office for any reason please go to the nearest emergency department      Megha Magana  04/11/2023

## 2023-04-11 NOTE — H&P
"HPI  Patient presenting for Procedure(s) (LRB):  BLOCK, NERVE RIGHT L3,L4,L5 MEDIAL BRANCH (Right)     Patient on Anti-coagulation No    No health changes since previous encounter. Patient also has neck pain that he would like addressed at next clinic visit.    Past Medical History:   Diagnosis Date    Anticoagulant long-term use     Arthritis     Cancer     Skin cancer--left ear    Hypertension     Squamous cell carcinoma of scalp 02/14/2023    mid frontal scalp    Squamous cell carcinoma of skin     Thyroid disease      Past Surgical History:   Procedure Laterality Date    COLONOSCOPY N/A 9/12/2016    Procedure: COLONOSCOPY Miralax split dose prep;  Surgeon: Luciano Mendoza Jr., MD;  Location: [facility] ENDO;  Service: Endoscopy;  Laterality: N/A;    INJECTION OF ANESTHETIC AGENT AROUND NERVE Right 3/28/2023    Procedure: BLOCK, NERVE RIGHT L3,L4,L5 MEDIAL BRANCH;  Surgeon: Megha Magana MD;  Location: [facility];  Service: Pain Management;  Laterality: Right;     Review of patient's allergies indicates:  No Known Allergies   Current Facility-Administered Medications   Medication    0.9%  NaCl infusion       PMHx, PSHx, Allergies, Medications reviewed in epic    ROS negative except pain complaints in HPI    OBJECTIVE:    BP (!) 169/76   Pulse (!) 46   Temp 97.8 °F (36.6 °C)   Resp 14   Ht 5' 7" (1.702 m)   Wt 63.5 kg (140 lb)   SpO2 96%   BMI 21.93 kg/m²     PHYSICAL EXAMINATION:    GENERAL: Well appearing, in no acute distress, alert and oriented x3.  PSYCH:  Mood and affect appropriate.  SKIN: Skin color, texture, turgor normal, no rashes or lesions which will impact the procedure.  CV: RRR with palpation of the radial artery.  PULM: No evidence of respiratory difficulty, symmetric chest rise. Clear to auscultation.  NEURO: Cranial nerves grossly intact.    Plan:    Proceed with procedure as planned Procedure(s) (LRB):  BLOCK, NERVE RIGHT L3,L4,L5 MEDIAL BRANCH (Right)    Lizandro " Sushant  04/11/2023

## 2023-04-11 NOTE — PLAN OF CARE
Pt tolerated procedure well. Pt reports 0/10 pain after procedure. Assisted pt up for first time. Steady on feet. No sedation given, patient states that he didn't need a ride home,wanted to drive himsellf home. Patient also refused transport to car. All discharge instructions given.

## 2023-04-11 NOTE — OP NOTE
Diagnostic Lumbar Medial Branch Block Under Fluoroscopy    The procedure, risks, benefits, and options were discussed with the patient. There are no contraindications to the procedure. The patent expressed understanding and agreed to the procedure. Informed written consent was obtained prior to the start of the procedure and can be found in the patient's chart.    PATIENT NAME: Mr. 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    INTERVAL HISTORY: Patient has clinical and imaging findings suggestive of facet mediated pain.    TECHNIQUE: Time-out was performed to identify the patient and procedure to be performed. With the patient laying in a prone position, the surgical area was prepped and draped in the usual sterile fashion using ChloraPrep and fenestrated drape. The levels were determined under fluoroscopic guidance. Skin anesthesia was achieved by injecting Lidocaine 2% over the injection sites. A 25 gauge, 3.5 inch needle was introduced into the medial branch nerves at the junctions of the superior articular process and the transverse processes of the targeted sites using AP, lateral and/or contralateral oblique fluoroscopic imaging. After negative aspiration for blood or CSF was confirmed, 0.5 mL of the anesthetic listed above was then slowly injected at each site. The needles were removed and bleeding was nil. A sterile dressing was applied. No  specimens collected. The patient tolerated the procedure well.     The patient was monitored after the procedure in the recovery area. They were given post-procedure and discharge instructions to follow at home. The patient was discharged in a stable condition.    Megha Magana MD

## 2023-04-13 ENCOUNTER — TELEPHONE (OUTPATIENT)
Dept: PAIN MEDICINE | Facility: CLINIC | Age: 83
End: 2023-04-13
Payer: MEDICARE

## 2023-04-13 ENCOUNTER — TELEPHONE (OUTPATIENT)
Dept: PAIN MEDICINE | Facility: OTHER | Age: 83
End: 2023-04-13
Payer: MEDICARE

## 2023-04-13 DIAGNOSIS — M47.817 LUMBOSACRAL SPONDYLOSIS WITHOUT MYELOPATHY: Primary | ICD-10-CM

## 2023-04-13 NOTE — TELEPHONE ENCOUNTER
----- Message from Maria Alejandra Juarez MA sent at 4/13/2023  2:15 PM CDT -----  Regarding: RE: Pain Dairy  Contact: ARNOLD CASTLE [[id number]]  Good Afternoon ,     The following patient had his second right L3,L4,L5 MBB 04/11/23 listed below is his pain diary.     Maria Alejandra   ----- Message -----  From: Nba Marinelli  Sent: 4/13/2023  11:28 AM CDT  To: [facility] Staff  Subject: Pain Dairy                                       Name of Who is Calling: ARNOLD CASTLE [[id number]]      What is the request in detail: Would like to speak with staff in regards to reporting pain dairy as follow as:    Time         Score                             Time        Score  1HR          100%                              6HR            100%  2HR          100%                              12 hr           100%  3HR          100%                              24 hr           100%  4HR          100%  5HR          100%                              overall: 100% no pain    Can the clinic reply by [facility]:       What Number to Call Back if not in [facility]: [phone]

## 2023-04-13 NOTE — TELEPHONE ENCOUNTER
----- Message from Milagros Montesinos sent at 4/13/2023  7:49 AM CDT -----  Regarding: Patient call back  Who called:ARNOLD CASTLE [[id number]]    What is the request in detail: Patient is requesting a call back. Pt would like to report his pain diary. He would like to further discuss.   Please advise.    Can the clinic reply by [facility]? No    Best call back number: [phone]    Additional Information: N/A

## 2023-04-27 ENCOUNTER — TELEPHONE (OUTPATIENT)
Dept: PAIN MEDICINE | Facility: CLINIC | Age: 83
End: 2023-04-27
Payer: MEDICARE

## 2023-04-27 NOTE — TELEPHONE ENCOUNTER
Patient was contacted and informed that his insurance carrier was out of network and he would have to have his procedure at another [facility] location. Patient was upset about this and wants to know if the procedure can be performed in [place].   Staff reached out to Pre Service regarding this request pending a response.

## 2023-04-27 NOTE — TELEPHONE ENCOUNTER
----- Message from Milagros Montesinos sent at 4/27/2023  9:41 AM CDT -----  Regarding: Patient call back  Who called: ARNOLD CASTLE [[id number]]    What is the request in detail: Patient is requesting a call back. Patient cannot make his appt tomorrow and would like to know if he can be seen next week instead of 7/31. He also states he received a call from the staff regarding his insurance being out of network, but he spoke with his insurance and they accept him being seen by [facility]. He would like to further discuss.   Please advise.    Can the clinic reply by [facility]? No    Best call back number: [phone]    Additional Information: N/A

## 2023-05-01 ENCOUNTER — TELEPHONE (OUTPATIENT)
Dept: PAIN MEDICINE | Facility: OTHER | Age: 83
End: 2023-05-01
Payer: MEDICARE

## 2023-05-02 ENCOUNTER — TELEPHONE (OUTPATIENT)
Dept: PAIN MEDICINE | Facility: CLINIC | Age: 83
End: 2023-05-02
Payer: MEDICARE

## 2023-05-02 DIAGNOSIS — M47.817 LUMBOSACRAL SPONDYLOSIS WITHOUT MYELOPATHY: Primary | ICD-10-CM

## 2023-05-02 NOTE — TELEPHONE ENCOUNTER
----- Message from Maria Alejandra Juarez MA sent at 5/2/2023 11:44 AM CDT -----  Regarding: RE: Procedure  Good Morning ,     The following patient can not have his procedure at [facility] due to the hospital not accepting his insurance. He would like to have the procedure performed at [facility] can you change the location on the order so that he can be contacted for [facility]?     Maria Alejandra   ----- Message -----  From: Kitty Tello  Sent: 5/1/2023   2:13 PM CDT  To: Maria Alejandra Juarez MA, [facility] Staff  Subject: Procedure                                        Patient wants to know if he can be scheduled at [facility] since his insurance is out of network at [facility]. He also states that he spoke with the  of his insurance company and states if [facility] accepts his insurance, it doesn't matter if he is out of network, he would just have to pay more. But he is willing to go to [facility].

## 2023-05-04 ENCOUNTER — TELEPHONE (OUTPATIENT)
Dept: ORTHOPEDICS | Facility: CLINIC | Age: 83
End: 2023-05-04
Payer: MEDICARE

## 2023-05-04 NOTE — TELEPHONE ENCOUNTER
----- Message from Sharee Orona sent at 5/4/2023 11:47 AM CDT -----  Type: Call Back      Who called: ARNOLD CASTLE [[id number]]      What is the request in detail: Patient is requesting a call back. Pt would like to know if he can get a copy of his biopsy report showing he was diagnosed with cancer for insurance purposes.   Please advise.     Can the clinic reply by [facility]? No      Would the patient rather a call back or a response via [facility]? Call back       Best call back number: [phone] (home)       Additional Information:

## 2023-05-05 ENCOUNTER — OFFICE VISIT (OUTPATIENT)
Dept: ORTHOPEDICS | Facility: CLINIC | Age: 83
End: 2023-05-05
Payer: MEDICARE

## 2023-05-05 VITALS — BODY MASS INDEX: 21.97 KG/M2 | HEIGHT: 67 IN | WEIGHT: 140 LBS

## 2023-05-05 DIAGNOSIS — C44.629: Primary | ICD-10-CM

## 2023-05-05 PROCEDURE — 99212 PR OFFICE/OUTPT VISIT, EST, LEVL II, 10-19 MIN: ICD-10-PCS | Mod: S$GLB,,, | Performed by: PLASTIC SURGERY

## 2023-05-05 PROCEDURE — 99212 OFFICE O/P EST SF 10 MIN: CPT | Mod: S$GLB,,, | Performed by: PLASTIC SURGERY

## 2023-05-05 PROCEDURE — 1159F MED LIST DOCD IN RCRD: CPT | Mod: CPTII,S$GLB,, | Performed by: PLASTIC SURGERY

## 2023-05-05 PROCEDURE — 1159F PR MEDICATION LIST DOCUMENTED IN MEDICAL RECORD: ICD-10-PCS | Mod: CPTII,S$GLB,, | Performed by: PLASTIC SURGERY

## 2023-05-05 PROCEDURE — 3288F PR FALLS RISK ASSESSMENT DOCUMENTED: ICD-10-PCS | Mod: CPTII,S$GLB,, | Performed by: PLASTIC SURGERY

## 2023-05-05 PROCEDURE — 1101F PT FALLS ASSESS-DOCD LE1/YR: CPT | Mod: CPTII,S$GLB,, | Performed by: PLASTIC SURGERY

## 2023-05-05 PROCEDURE — 99999 PR PBB SHADOW E&M-EST. PATIENT-LVL III: ICD-10-PCS | Mod: PBBFAC,,, | Performed by: PLASTIC SURGERY

## 2023-05-05 PROCEDURE — 1126F PR PAIN SEVERITY QUANTIFIED, NO PAIN PRESENT: ICD-10-PCS | Mod: CPTII,S$GLB,, | Performed by: PLASTIC SURGERY

## 2023-05-05 PROCEDURE — 1101F PR PT FALLS ASSESS DOC 0-1 FALLS W/OUT INJ PAST YR: ICD-10-PCS | Mod: CPTII,S$GLB,, | Performed by: PLASTIC SURGERY

## 2023-05-05 PROCEDURE — 1126F AMNT PAIN NOTED NONE PRSNT: CPT | Mod: CPTII,S$GLB,, | Performed by: PLASTIC SURGERY

## 2023-05-05 PROCEDURE — 99999 PR PBB SHADOW E&M-EST. PATIENT-LVL III: CPT | Mod: PBBFAC,,, | Performed by: PLASTIC SURGERY

## 2023-05-05 PROCEDURE — 3288F FALL RISK ASSESSMENT DOCD: CPT | Mod: CPTII,S$GLB,, | Performed by: PLASTIC SURGERY

## 2023-05-05 NOTE — PROGRESS NOTES
Subjective:     Patient ID: Piter Hernandez is a 82 y.o. male.    Chief Complaint: Injury of the Left Hand    Piter Hernandez returns to clinic today for evaluation of his left middle finger.  He just finished his Efudex treatment with his Mohs surgeon and has noticed improvement.  The pain that he was experiencing is gone.  He has noticed that the nail has changed and is slightly elevated on the radial aspect of the nail plate.  He denies any pain or symptoms associated with it.  He has no other complaints      Review of Systems   All other systems reviewed and are negative.    Objective:       General    Vitals reviewed.  Constitutional: He is oriented to person, place, and time. He appears well-nourished. No distress.   Pulmonary/Chest: Effort normal.   Neurological: He is alert and oriented to person, place, and time.   Psychiatric: He has a normal mood and affect.         Left Hand/Wrist Exam     Inspection   Effusion:  Hand -  absent    Other     Sensory Exam  Median Distribution: normal  Ulnar Distribution: normal  Radial Distribution: normal    Comments:  Hyperemia surrounding the paronychia of the left middle finger nail bed no signs of infection the previous lesion is no longer present.  There is elevation and discoloration of the nail on the radial aspect of the middle finger the proximal aspect of the nail plate is well adhered to the sterile matrix.          Vascular Exam       Capillary Refill  Left Hand: normal capillary refill        Physical Exam  Vitals reviewed.   Constitutional:       General: He is not in acute distress.     Appearance: He is well-nourished.   Pulmonary:      Effort: Pulmonary effort is normal.   Musculoskeletal:      Left hand: Normal sensation of the ulnar distribution, median distribution and radial distribution. Normal capillary refill.   Neurological:      Mental Status: He is alert and oriented to person, place, and time.   Psychiatric:         Mood and Affect: Mood and  affect normal.       Assessment:     Encounter Diagnosis   Name Primary?    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  I discussed this will likely continue to grow out as the nail progresses distally.  He was advised to trim the nail as needed.  He is to follow up if he has any failure to improve otherwise he was instructed to follow up with his general dermatologist for evaluation and surveillance of the previous middle finger lesion.  He agree with this above assessment plan all questions concerns were addressed

## 2023-05-08 ENCOUNTER — TELEPHONE (OUTPATIENT)
Dept: PAIN MEDICINE | Facility: CLINIC | Age: 83
End: 2023-05-08
Payer: MEDICARE

## 2023-05-08 NOTE — TELEPHONE ENCOUNTER
----- Message from Avelino Baum sent at 5/8/2023  2:15 PM CDT -----  Type:  Patient Returning Call    Who Called:pt   Who Left Message for Patient:  Does the patient know what this is regarding?:appt  Would the patient rather a call back or a response via [facility]? call  Best Call Back Number:[phone]  Additional Information: pt has an appt on 05/09/2023 and would like to know what time to arrive for his appt

## 2023-05-12 ENCOUNTER — TELEPHONE (OUTPATIENT)
Dept: PAIN MEDICINE | Facility: CLINIC | Age: 83
End: 2023-05-12
Payer: MEDICARE

## 2023-05-12 DIAGNOSIS — M47.817 LUMBOSACRAL SPONDYLOSIS WITHOUT MYELOPATHY: Primary | ICD-10-CM

## 2023-05-12 NOTE — TELEPHONE ENCOUNTER
----- Message from Virginia Juancarlos sent at 5/12/2023  9:02 AM CDT -----                  Name of Who is Calling: Piter Hernandez    Who Left The Message: Piter Hernandez      What is the request in detail:    Patient called requesting a call regarding medical clearance from his Cardiologist to stop the blood thinners prior to his procedure. Patient's Cardiologist is Dr. Khang Matta MD   Please further advise.   Thank you    Reply by [facility]: NO      Preferred Call Back :  [phone]    Dr. Khang Larson MD's Office: [phone]  or  [phone]

## 2023-05-15 ENCOUNTER — TELEPHONE (OUTPATIENT)
Dept: PAIN MEDICINE | Facility: CLINIC | Age: 83
End: 2023-05-15
Payer: MEDICARE

## 2023-05-15 NOTE — TELEPHONE ENCOUNTER
----- Message from Maricel Bauer sent at 5/15/2023 11:19 AM CDT -----  Regarding: Patient Advice               Name of Who is Calling: Piter Quanisson     Who Left The Message: Piter Quanisson       What is the request in detail:    Patient called requesting a call regarding medical clearance from his Cardiologist to stop the blood thinners prior to his procedure. Patient's Cardiologist is Dr. Khang Matta MD   Please further advise.   Thank you     Reply by [facility]: NO       Preferred Call Back :  [phone]     Dr. Khang Larson MD's Office: [phone]  or  [phone]     Attn: Ting or Sarah @ Dr. Khang Matta MD

## 2023-05-24 ENCOUNTER — OFFICE VISIT (OUTPATIENT)
Dept: PAIN MEDICINE | Facility: CLINIC | Age: 83
End: 2023-05-24
Payer: MEDICARE

## 2023-05-24 VITALS
OXYGEN SATURATION: 96 % | HEIGHT: 67 IN | DIASTOLIC BLOOD PRESSURE: 77 MMHG | RESPIRATION RATE: 16 BRPM | WEIGHT: 140.63 LBS | HEART RATE: 71 BPM | SYSTOLIC BLOOD PRESSURE: 146 MMHG | BODY MASS INDEX: 22.07 KG/M2

## 2023-05-24 DIAGNOSIS — M54.2 CERVICALGIA: ICD-10-CM

## 2023-05-24 DIAGNOSIS — M47.817 LUMBOSACRAL SPONDYLOSIS WITHOUT MYELOPATHY: Primary | ICD-10-CM

## 2023-05-24 PROCEDURE — 3078F DIAST BP <80 MM HG: CPT | Mod: CPTII,S$GLB,, | Performed by: ANESTHESIOLOGY

## 2023-05-24 PROCEDURE — 99214 OFFICE O/P EST MOD 30 MIN: CPT | Mod: S$GLB,,, | Performed by: ANESTHESIOLOGY

## 2023-05-24 PROCEDURE — 3077F PR MOST RECENT SYSTOLIC BLOOD PRESSURE >= 140 MM HG: ICD-10-PCS | Mod: CPTII,S$GLB,, | Performed by: ANESTHESIOLOGY

## 2023-05-24 PROCEDURE — 1101F PT FALLS ASSESS-DOCD LE1/YR: CPT | Mod: CPTII,S$GLB,, | Performed by: ANESTHESIOLOGY

## 2023-05-24 PROCEDURE — 3078F PR MOST RECENT DIASTOLIC BLOOD PRESSURE < 80 MM HG: ICD-10-PCS | Mod: CPTII,S$GLB,, | Performed by: ANESTHESIOLOGY

## 2023-05-24 PROCEDURE — 3077F SYST BP >= 140 MM HG: CPT | Mod: CPTII,S$GLB,, | Performed by: ANESTHESIOLOGY

## 2023-05-24 PROCEDURE — 3288F FALL RISK ASSESSMENT DOCD: CPT | Mod: CPTII,S$GLB,, | Performed by: ANESTHESIOLOGY

## 2023-05-24 PROCEDURE — 1125F AMNT PAIN NOTED PAIN PRSNT: CPT | Mod: CPTII,S$GLB,, | Performed by: ANESTHESIOLOGY

## 2023-05-24 PROCEDURE — 1101F PR PT FALLS ASSESS DOC 0-1 FALLS W/OUT INJ PAST YR: ICD-10-PCS | Mod: CPTII,S$GLB,, | Performed by: ANESTHESIOLOGY

## 2023-05-24 PROCEDURE — 1159F PR MEDICATION LIST DOCUMENTED IN MEDICAL RECORD: ICD-10-PCS | Mod: CPTII,S$GLB,, | Performed by: ANESTHESIOLOGY

## 2023-05-24 PROCEDURE — 99214 PR OFFICE/OUTPT VISIT, EST, LEVL IV, 30-39 MIN: ICD-10-PCS | Mod: S$GLB,,, | Performed by: ANESTHESIOLOGY

## 2023-05-24 PROCEDURE — 1159F MED LIST DOCD IN RCRD: CPT | Mod: CPTII,S$GLB,, | Performed by: ANESTHESIOLOGY

## 2023-05-24 PROCEDURE — 99999 PR PBB SHADOW E&M-EST. PATIENT-LVL III: ICD-10-PCS | Mod: PBBFAC,,, | Performed by: ANESTHESIOLOGY

## 2023-05-24 PROCEDURE — 3288F PR FALLS RISK ASSESSMENT DOCUMENTED: ICD-10-PCS | Mod: CPTII,S$GLB,, | Performed by: ANESTHESIOLOGY

## 2023-05-24 PROCEDURE — 99999 PR PBB SHADOW E&M-EST. PATIENT-LVL III: CPT | Mod: PBBFAC,,, | Performed by: ANESTHESIOLOGY

## 2023-05-24 PROCEDURE — 1125F PR PAIN SEVERITY QUANTIFIED, PAIN PRESENT: ICD-10-PCS | Mod: CPTII,S$GLB,, | Performed by: ANESTHESIOLOGY

## 2023-05-24 NOTE — H&P (VIEW-ONLY)
-PCP: Anthony Tian MD    REFERRING PHYSICIAN: No ref. provider found    CHIEF COMPLAINT: Low back pain    Original HISTORY OF PRESENT ILLNESS: Piter Hernandez presents to the clinic for the evaluation of the above pain.     Of note, patient does have a history of Afib and prior-CVA and is currently on Eliquis.    Original Pain Description:  The pain is located in the low back, mostly on the right side, and does not radiate. The pain is described as aching, sharp, and stabbing. Exacerbating factors: Sitting, Standing, Bending, Walking, Extension, Flexing, and Lifting. Mitigating factors heat, ice, massage, and medications. Symptoms interfere with daily activity and sleeping. The patient feels like symptoms have been unchanged. Patient denies urinary incontinence, bowel incontinence, significant weight loss, significant motor weakness, and loss of sensations.    Original PAIN SCORES:  Best: Pain is 0  Worst: Pain is 9  Usually: Pain is 7  Current: Pain is 0    Interval history:  INTERVAL HISTORY 3/8/23:   Patient was referred to healthy back and advance imaging was ordered.   Lumbar radiographs flex/ex with disc space narrowing and endplate changes at every level.  Facet arthropathy most pronounced L4-5 and L5-S1.  AP alignment is anatomic.  Dextroconvex curvature lumbar spine. Lumbar MRI obtained demonstrating the followin. Lumbar dextroscoliosis with superimposed degenerative changes contributing to mild-to-moderate neural foraminal narrowing from L2-L3 through L5-S1 as detailed above.  2. Prominent right degenerative facet edema at L5-S1.  Today he reports that he is not interested in therapy or the healthy back program as previous therapy efforts were ineffective at relief (last PT was 6 months ago per patient). He, instead, derives relief from biking, walking his dog. He does not take any oral medications for this condition and is interested in trying interventional procedures. Pain will radiate  "from low lumbar spine to right flank area, lateral and just inferior to iliac crest.     Interval history 5/24/23: Mr. Hernandez is s/p MBB x 2 of R l3-5 with 100% relief. RFA planned for 6/20/23. He reports 80% relief of his low back pain. Today, he returns today for neck pain. His neck pain has been present for years. His pain is axial in nature without radiation. Pain is worse with rotation, flexion, and extension. In the past had taken muscle relaxer and pain pills with some relief. Currently takes tramadol that takes the edge off. Today his pain is a 9/10 in the neck. At its best his pain will be a 6/10. Not currently doing physical therapy, but is doing home stretching with some relief. Denies loss of strength or sensation.    6 weeks of Conservative therapy (PT/Chiro/Home Exercises with Dates)  PT - "6 months ago"  Chiropractor - August 2022- November 2022  Acupuncture - 10 years ago  Massage - as often as possible  HEP - Does push ups, biking, walking, squats, and stretching daily at home for the past 2 years.     Treatments / Medications: (Ice/Heat/NSAIDS/APAP/etc):  Tramodol  Ambien  Heat/Ice  Norco (in the past from previous surgery)  Acetaminophen (upset stomach)     Report:  Reviewed and consistent with medication use as prescribed    Interventional Pain Procedures: (Previous injections)  Back injections 6 years ago and again 4 years ago - unsure of what was done but didn't help  3/28/23, 4/11/23: Right L3-5 MBB x2 80% pain relief from each    Past Medical History:   Diagnosis Date    Anticoagulant long-term use     Arthritis     Cancer     Skin cancer--left ear    Hypertension     Squamous cell carcinoma of scalp 02/14/2023    mid frontal scalp    Squamous cell carcinoma of skin     Thyroid disease      Past Surgical History:   Procedure Laterality Date    COLONOSCOPY N/A 9/12/2016    Procedure: COLONOSCOPY Miralax split dose prep;  Surgeon: Luciano Mendoza Jr., MD;  Location: [facility];  Service: " Endoscopy;  Laterality: N/A;    INJECTION OF ANESTHETIC AGENT AROUND NERVE Right 3/28/2023    Procedure: BLOCK, NERVE RIGHT L3,L4,L5 MEDIAL BRANCH;  Surgeon: Megha Magana MD;  Location: [facility];  Service: Pain Management;  Laterality: Right;    INJECTION OF ANESTHETIC AGENT AROUND NERVE Right 2023    Procedure: BLOCK, NERVE RIGHT L3,L4,L5 MEDIAL BRANCH;  Surgeon: Megha Magana MD;  Location: [facility];  Service: Pain Management;  Laterality: Right;    RADIOFREQUENCY ABLATION Right 2023    Procedure: RADIOFREQUENCY ABLATION RIGHT L3-5 CLEARED TO HOLD PLAVIX FOR 7 DAYS AND ELIQUIS FOR 3 DAYS;  Surgeon: Megha Magana MD;  Location: [facility];  Service: Pain Management;  Laterality: Right;     Social History     Socioeconomic History    Marital status:    Tobacco Use    Smoking status: Former     Types: Cigarettes     Quit date: 1/10/1986     Years since quittin.3    Smokeless tobacco: Never   Substance and Sexual Activity    Alcohol use: Yes    Drug use: No     No family history on file.    Review of patient's allergies indicates:  No Known Allergies    Current Outpatient Medications   Medication Sig    albuterol (PROVENTIL/VENTOLIN HFA) 90 mcg/actuation inhaler Inhale 2 puffs every 6 (six) hours as needed into the lungs for Wheezing    alprazolam (XANAX) 0.25 MG tablet Take 0.25 mg by mouth 2 (two) times daily.    amoxicillin-clavulanate 875-125mg (AUGMENTIN) 875-125 mg per tablet Take 1 tablet in the morning and 1 tablet before bedtime by mouth. Do all this for 10 days.    apixaban (ELIQUIS ORAL) Take by mouth.    APRISO 0.375 gram Cp24 TAKE 4 CAPSULES BY MOUTH DAILY    aspirin (ECOTRIN) 81 MG EC tablet Take 81 mg by mouth once daily.    atenolol (TENORMIN) 50 MG tablet Take 50 mg by mouth once daily.    azathioprine (IMURAN) 50 mg Tab TAKE 2 TABLETS DAILY    azithromycin (Z-KENISHA) 250 MG tablet TAKE BY MOUTH 2 TABS DAY 1 THEN 1 TAB DAILY DAYS 2 THROUGH 5 AS DIRECTED  "BY MD    chlorzoxazone (PARAFON FORTE) 500 mg Tab Take 250 mg by mouth 4 (four) times daily as needed.    clopidogreL (PLAVIX) 75 mg tablet Take 75 mg by mouth.    fenofibrate micronized (LOFIBRA) 134 MG Cap Take 134 mg by mouth daily with breakfast.    HYDROcodone-acetaminophen (NORCO) 5-325 mg per tablet Take 1 tablet by mouth every 6 (six) hours as needed for Pain.    mesalamine (CANASA) 1000 MG Supp Place 1 suppository (1,000 mg total) rectally nightly.    mupirocin (BACTROBAN) 2 % ointment Apply to affected area 3 times daily    rosuvastatin (CRESTOR) 10 MG tablet Take 10 mg by mouth once daily.    tramadol (ULTRAM) 50 mg tablet Take 50 mg by mouth Daily.    zolpidem (AMBIEN) 10 mg Tab Take 10 mg by mouth every evening.     No current facility-administered medications for this visit.     ROS:  GENERAL: No fever. No chills. No fatigue. Denies weight loss. Denies weight gain.  HEENT: Denies headaches. Denies vision change. Denies eye pain. Denies double vision. Denies ear pain.   CV: Denies chest pain.   PULM: Denies of shortness of breath.  GI: Denies constipation. No diarrhea. No abdominal pain. Denies nausea. Denies vomiting. No blood in stool.  HEME: Denies bleeding problems.  : Denies urgency. No painful urination. No blood in urine.  MS: Denies joint stiffness. Denies joint swelling.  Back pain. Neck pain.  SKIN: Denies rash.   NEURO: Denies seizures. No weakness.  PSYCH:  Denies difficulty sleeping. No anxiety. Denies depression. No suicidal thoughts.     VITALS:   Vitals:    05/24/23 1003   BP: (!) 146/77   Pulse: 71   Resp: 16   SpO2: 96%   Weight: 63.8 kg (140 lb 10.5 oz)   Height: 5' 7" (1.702 m)   PainSc:   9         PHYSICAL EXAM:   GENERAL: Well appearing, in no acute distress, alert and oriented x3.  PSYCH:  Mood and affect appropriate.  SKIN: Skin color, texture, turgor normal, no rashes or lesions.  HEENT:  Normocephalic, atraumatic. Cranial nerves grossly intact.  PULM: No evidence of " respiratory difficulty, symmetric chest rise.  GI:  Non-distended  BACK: Normal range of motion.    Neck: Decreased range of motion with flexion, rotation, and most prominent with extension. Pain with extension. Facet loading bilaterally. Negative spurling. Upper extremity strength 5/5 bilaterally.   EXTREMITIES: No deformities, edema, or skin discoloration.   MUSCULOSKELETAL:   Bilateral upper extremity strength is normal and symmetric. No atrophy is noted.  NEURO: Sensation is equal and appropriate bilaterally. Bilateral upper and lower extremity coordination and muscle stretch reflexes are physiologic and symmetric.  GAIT: Normal.    IMAGING:      EXAMINATION:  MRI LUMBAR SPINE WITHOUT CONTRAST     CLINICAL HISTORY:  Low back pain, symptoms persist with > 6wks conservative treatment; Low back pain, unspecified     TECHNIQUE:  Multiplanar, multisequence MR images were acquired from the thoracolumbar junction to the sacrum without the administration of contrast.     COMPARISON:  Radiograph 02/08/2023.     FINDINGS:  Lumbar spine alignment demonstrates dextroscoliosis with mild grade 1 retrolisthesis of L2 on L3 and mild grade 1 anterolisthesis of L5 on S1.  No spondylolysis.  Vertebral body heights are well maintained without evidence for fracture.  No marrow signal abnormality to suggest an infiltrative process.  Prominent right degenerative facet edema at L5-S1 with a posteriorly oriented synovial cyst.     Partial solid osseous interbody fusion at L3-L4.  There is degenerative disc desiccation throughout the remaining of the visualized thoracolumbar spine with mild-to-moderate height loss at L2-L3, L4-L5 and L5-S1.  Multilevel chronic degenerative endplate changes without significant superimposed edema.     Distal spinal cord demonstrates normal contour and signal intensity.  Cauda equina appears normal without findings to suggest arachnoiditis.  Conus medullaris terminates at T12-L1.     Dilated pancreatic duct  measuring up to 4 mm.  Bilateral cortical renal cysts.  There are numerous colonic diverticuli.  SI joints are symmetric.  Mild patchy edema of the posterior paraspinal musculature.     T12-L1: No spinal canal stenosis.  No neural foraminal narrowing.     L1-L2: Circumferential disc bulge.  No spinal canal stenosis.  No neural foraminal narrowing.     L2-L3: Mild grade 1 retrolisthesis.  Circumferential disc bulge.  Bilateral facet arthropathy and bilateral ligamentum flavum buckling.  No spinal canal stenosis.  Moderate left and mild right neural foraminal narrowing.     L3-L4: Circumferential disc osteophyte complex.  Bilateral facet arthropathy and bilateral ligamentum flavum buckling.  No spinal canal stenosis.  Mild right and mild-to-moderate left neural foraminal narrowing.     L4-L5: Circumferential disc bulge.  Bilateral facet arthropathy and bilateral ligamentum flavum buckling.  Mild-to-moderate left lateral recess stenosis.  Moderate left and mild right neural foraminal narrowing.     L5-S1: Mild grade 1 anterolisthesis.  Circumferential disc bulge.  Bilateral facet arthropathy and bilateral ligamentum flavum buckling.  No spinal canal stenosis.  Moderate right neural foraminal narrowing.     Impression:     1. Lumbar dextroscoliosis with superimposed degenerative changes contributing to mild-to-moderate neural foraminal narrowing from L2-L3 through L5-S1 as detailed above.  2. Prominent right degenerative facet edema at L5-S1.  3. Mild pancreatic ductal dilatation, incompletely evaluated on this exam.  Recommend correlation with laboratory values and further workup with MRCP as clinically warranted.        Electronically signed by: Ben Leonard MD  Date:                                            02/23/2023  Time:                                           15:08    EXAMINATION:  XR LUMBAR SPINE 5 VIEW WITH FLEX AND EXT     CLINICAL HISTORY:  Other intervertebral disc degeneration, lumbar region      TECHNIQUE:  AP, lateral, and oblique images are performed through the lumbar spine.     COMPARISON:  None     FINDINGS:  Lumbar vertebral body heights are maintained.     Disc space narrowing and endplate changes at every level.  Facet arthropathy most pronounced L4-5 and L5-S1.     AP alignment is anatomic.  Dextroconvex curvature lumbar spine.     Aortic atherosclerosis.     Impression:     No acute osseous abnormality seen.  Degenerative change as above.    EXAMINATION:  XR CERVICAL SPINE 5 VIEW WITH FLEX AND EXT     CLINICAL HISTORY:  Cervicalgia     TECHNIQUE:  Five views of the cervical spine plus flexion and extension views were performed.     COMPARISON:  None.     FINDINGS:  There is reversal usual cervical lordosis with forward kyphotic curvature centered at C4.  C3- 4 has 3 mm of anterolisthesis, and C4-5 has 3 mm of retrolisthesis.  C3 through T1 levels all have severe disc space narrowing.  Facet arthropathy is most prominent on the right at C2-3 and C3-4, on the left at C3-4.  Surgical clips are present in both sides of the neck, no prevertebral soft tissue swelling is seen.     Flexion and extension views demonstrate 1 mm greater anterolisthesis at C3-4 with flexion.  No fracture is seen.     Oblique views show neural foramen narrowing that is greatest at C4-5 on the right.  All the foramen on left appear narrowed, but this may be due to the technique and positioning of the x-ray.     Impression:     Extensive degenerative disc disease and spondylosis.  Alignment abnormality, as above        Electronically signed by: Jordan Frederick MD  Date:                                            03/20/2023        Electronically signed by: Elena Vanegas  Date:                                            02/08/2023  Time:                                           14:41    ASSESSMENT: 82 y.o. year old male with pain, consistent with:    Encounter Diagnoses   Name Primary?    Lumbosacral spondylosis without  myelopathy Yes    Cervicalgia          DISCUSSION: Mr. Hernandez is a very nice octogenarian who comes to us with chronic low back pain, worse on the right side. Imaging shows multilevel DDD with Modic changes around L5/S1. It also shows multilevel facet arthropathy with promient facet edema at L5/S1. He did well with LMBB and returned to discuss neck pain. Xray shows multilevel DDD and facet arthropathy.     PLAN:  Reviewed cervical imaging today  Continue with scheduled right L3-5 RFA (6/20)  Follow up after RFA to discuss neck pain and consider bilateral C3,4,5 MBB

## 2023-05-24 NOTE — PROGRESS NOTES
-PCP: Anthony Tian MD    REFERRING PHYSICIAN: No ref. provider found    CHIEF COMPLAINT: Low back pain    Original HISTORY OF PRESENT ILLNESS: Piter Hernandez presents to the clinic for the evaluation of the above pain.     Of note, patient does have a history of Afib and prior-CVA and is currently on Eliquis.    Original Pain Description:  The pain is located in the low back, mostly on the right side, and does not radiate. The pain is described as aching, sharp, and stabbing. Exacerbating factors: Sitting, Standing, Bending, Walking, Extension, Flexing, and Lifting. Mitigating factors heat, ice, massage, and medications. Symptoms interfere with daily activity and sleeping. The patient feels like symptoms have been unchanged. Patient denies urinary incontinence, bowel incontinence, significant weight loss, significant motor weakness, and loss of sensations.    Original PAIN SCORES:  Best: Pain is 0  Worst: Pain is 9  Usually: Pain is 7  Current: Pain is 0    Interval history:  INTERVAL HISTORY 3/8/23:   Patient was referred to healthy back and advance imaging was ordered.   Lumbar radiographs flex/ex with disc space narrowing and endplate changes at every level.  Facet arthropathy most pronounced L4-5 and L5-S1.  AP alignment is anatomic.  Dextroconvex curvature lumbar spine. Lumbar MRI obtained demonstrating the followin. Lumbar dextroscoliosis with superimposed degenerative changes contributing to mild-to-moderate neural foraminal narrowing from L2-L3 through L5-S1 as detailed above.  2. Prominent right degenerative facet edema at L5-S1.  Today he reports that he is not interested in therapy or the healthy back program as previous therapy efforts were ineffective at relief (last PT was 6 months ago per patient). He, instead, derives relief from biking, walking his dog. He does not take any oral medications for this condition and is interested in trying interventional procedures. Pain will radiate  "from low lumbar spine to right flank area, lateral and just inferior to iliac crest.     Interval history 5/24/23: Mr. Hernandez is s/p MBB x 2 of R l3-5 with 100% relief. RFA planned for 6/20/23. He reports 80% relief of his low back pain. Today, he returns today for neck pain. His neck pain has been present for years. His pain is axial in nature without radiation. Pain is worse with rotation, flexion, and extension. In the past had taken muscle relaxer and pain pills with some relief. Currently takes tramadol that takes the edge off. Today his pain is a 9/10 in the neck. At its best his pain will be a 6/10. Not currently doing physical therapy, but is doing home stretching with some relief. Denies loss of strength or sensation.    6 weeks of Conservative therapy (PT/Chiro/Home Exercises with Dates)  PT - "6 months ago"  Chiropractor - August 2022- November 2022  Acupuncture - 10 years ago  Massage - as often as possible  HEP - Does push ups, biking, walking, squats, and stretching daily at home for the past 2 years.     Treatments / Medications: (Ice/Heat/NSAIDS/APAP/etc):  Tramodol  Ambien  Heat/Ice  Norco (in the past from previous surgery)  Acetaminophen (upset stomach)     Report:  Reviewed and consistent with medication use as prescribed    Interventional Pain Procedures: (Previous injections)  Back injections 6 years ago and again 4 years ago - unsure of what was done but didn't help  3/28/23, 4/11/23: Right L3-5 MBB x2 80% pain relief from each    Past Medical History:   Diagnosis Date    Anticoagulant long-term use     Arthritis     Cancer     Skin cancer--left ear    Hypertension     Squamous cell carcinoma of scalp 02/14/2023    mid frontal scalp    Squamous cell carcinoma of skin     Thyroid disease      Past Surgical History:   Procedure Laterality Date    COLONOSCOPY N/A 9/12/2016    Procedure: COLONOSCOPY Miralax split dose prep;  Surgeon: Luciano Mendoza Jr., MD;  Location: [facility];  Service: " Endoscopy;  Laterality: N/A;    INJECTION OF ANESTHETIC AGENT AROUND NERVE Right 3/28/2023    Procedure: BLOCK, NERVE RIGHT L3,L4,L5 MEDIAL BRANCH;  Surgeon: Megha Magana MD;  Location: [facility];  Service: Pain Management;  Laterality: Right;    INJECTION OF ANESTHETIC AGENT AROUND NERVE Right 2023    Procedure: BLOCK, NERVE RIGHT L3,L4,L5 MEDIAL BRANCH;  Surgeon: Megha Magana MD;  Location: [facility];  Service: Pain Management;  Laterality: Right;    RADIOFREQUENCY ABLATION Right 2023    Procedure: RADIOFREQUENCY ABLATION RIGHT L3-5 CLEARED TO HOLD PLAVIX FOR 7 DAYS AND ELIQUIS FOR 3 DAYS;  Surgeon: Megha Magana MD;  Location: [facility];  Service: Pain Management;  Laterality: Right;     Social History     Socioeconomic History    Marital status:    Tobacco Use    Smoking status: Former     Types: Cigarettes     Quit date: 1/10/1986     Years since quittin.3    Smokeless tobacco: Never   Substance and Sexual Activity    Alcohol use: Yes    Drug use: No     No family history on file.    Review of patient's allergies indicates:  No Known Allergies    Current Outpatient Medications   Medication Sig    albuterol (PROVENTIL/VENTOLIN HFA) 90 mcg/actuation inhaler Inhale 2 puffs every 6 (six) hours as needed into the lungs for Wheezing    alprazolam (XANAX) 0.25 MG tablet Take 0.25 mg by mouth 2 (two) times daily.    amoxicillin-clavulanate 875-125mg (AUGMENTIN) 875-125 mg per tablet Take 1 tablet in the morning and 1 tablet before bedtime by mouth. Do all this for 10 days.    apixaban (ELIQUIS ORAL) Take by mouth.    APRISO 0.375 gram Cp24 TAKE 4 CAPSULES BY MOUTH DAILY    aspirin (ECOTRIN) 81 MG EC tablet Take 81 mg by mouth once daily.    atenolol (TENORMIN) 50 MG tablet Take 50 mg by mouth once daily.    azathioprine (IMURAN) 50 mg Tab TAKE 2 TABLETS DAILY    azithromycin (Z-KENISHA) 250 MG tablet TAKE BY MOUTH 2 TABS DAY 1 THEN 1 TAB DAILY DAYS 2 THROUGH 5 AS DIRECTED  "BY MD    chlorzoxazone (PARAFON FORTE) 500 mg Tab Take 250 mg by mouth 4 (four) times daily as needed.    clopidogreL (PLAVIX) 75 mg tablet Take 75 mg by mouth.    fenofibrate micronized (LOFIBRA) 134 MG Cap Take 134 mg by mouth daily with breakfast.    HYDROcodone-acetaminophen (NORCO) 5-325 mg per tablet Take 1 tablet by mouth every 6 (six) hours as needed for Pain.    mesalamine (CANASA) 1000 MG Supp Place 1 suppository (1,000 mg total) rectally nightly.    mupirocin (BACTROBAN) 2 % ointment Apply to affected area 3 times daily    rosuvastatin (CRESTOR) 10 MG tablet Take 10 mg by mouth once daily.    tramadol (ULTRAM) 50 mg tablet Take 50 mg by mouth Daily.    zolpidem (AMBIEN) 10 mg Tab Take 10 mg by mouth every evening.     No current facility-administered medications for this visit.     ROS:  GENERAL: No fever. No chills. No fatigue. Denies weight loss. Denies weight gain.  HEENT: Denies headaches. Denies vision change. Denies eye pain. Denies double vision. Denies ear pain.   CV: Denies chest pain.   PULM: Denies of shortness of breath.  GI: Denies constipation. No diarrhea. No abdominal pain. Denies nausea. Denies vomiting. No blood in stool.  HEME: Denies bleeding problems.  : Denies urgency. No painful urination. No blood in urine.  MS: Denies joint stiffness. Denies joint swelling.  Back pain. Neck pain.  SKIN: Denies rash.   NEURO: Denies seizures. No weakness.  PSYCH:  Denies difficulty sleeping. No anxiety. Denies depression. No suicidal thoughts.     VITALS:   Vitals:    05/24/23 1003   BP: (!) 146/77   Pulse: 71   Resp: 16   SpO2: 96%   Weight: 63.8 kg (140 lb 10.5 oz)   Height: 5' 7" (1.702 m)   PainSc:   9         PHYSICAL EXAM:   GENERAL: Well appearing, in no acute distress, alert and oriented x3.  PSYCH:  Mood and affect appropriate.  SKIN: Skin color, texture, turgor normal, no rashes or lesions.  HEENT:  Normocephalic, atraumatic. Cranial nerves grossly intact.  PULM: No evidence of " respiratory difficulty, symmetric chest rise.  GI:  Non-distended  BACK: Normal range of motion.    Neck: Decreased range of motion with flexion, rotation, and most prominent with extension. Pain with extension. Facet loading bilaterally. Negative spurling. Upper extremity strength 5/5 bilaterally.   EXTREMITIES: No deformities, edema, or skin discoloration.   MUSCULOSKELETAL:   Bilateral upper extremity strength is normal and symmetric. No atrophy is noted.  NEURO: Sensation is equal and appropriate bilaterally. Bilateral upper and lower extremity coordination and muscle stretch reflexes are physiologic and symmetric.  GAIT: Normal.    IMAGING:      EXAMINATION:  MRI LUMBAR SPINE WITHOUT CONTRAST     CLINICAL HISTORY:  Low back pain, symptoms persist with > 6wks conservative treatment; Low back pain, unspecified     TECHNIQUE:  Multiplanar, multisequence MR images were acquired from the thoracolumbar junction to the sacrum without the administration of contrast.     COMPARISON:  Radiograph 02/08/2023.     FINDINGS:  Lumbar spine alignment demonstrates dextroscoliosis with mild grade 1 retrolisthesis of L2 on L3 and mild grade 1 anterolisthesis of L5 on S1.  No spondylolysis.  Vertebral body heights are well maintained without evidence for fracture.  No marrow signal abnormality to suggest an infiltrative process.  Prominent right degenerative facet edema at L5-S1 with a posteriorly oriented synovial cyst.     Partial solid osseous interbody fusion at L3-L4.  There is degenerative disc desiccation throughout the remaining of the visualized thoracolumbar spine with mild-to-moderate height loss at L2-L3, L4-L5 and L5-S1.  Multilevel chronic degenerative endplate changes without significant superimposed edema.     Distal spinal cord demonstrates normal contour and signal intensity.  Cauda equina appears normal without findings to suggest arachnoiditis.  Conus medullaris terminates at T12-L1.     Dilated pancreatic duct  measuring up to 4 mm.  Bilateral cortical renal cysts.  There are numerous colonic diverticuli.  SI joints are symmetric.  Mild patchy edema of the posterior paraspinal musculature.     T12-L1: No spinal canal stenosis.  No neural foraminal narrowing.     L1-L2: Circumferential disc bulge.  No spinal canal stenosis.  No neural foraminal narrowing.     L2-L3: Mild grade 1 retrolisthesis.  Circumferential disc bulge.  Bilateral facet arthropathy and bilateral ligamentum flavum buckling.  No spinal canal stenosis.  Moderate left and mild right neural foraminal narrowing.     L3-L4: Circumferential disc osteophyte complex.  Bilateral facet arthropathy and bilateral ligamentum flavum buckling.  No spinal canal stenosis.  Mild right and mild-to-moderate left neural foraminal narrowing.     L4-L5: Circumferential disc bulge.  Bilateral facet arthropathy and bilateral ligamentum flavum buckling.  Mild-to-moderate left lateral recess stenosis.  Moderate left and mild right neural foraminal narrowing.     L5-S1: Mild grade 1 anterolisthesis.  Circumferential disc bulge.  Bilateral facet arthropathy and bilateral ligamentum flavum buckling.  No spinal canal stenosis.  Moderate right neural foraminal narrowing.     Impression:     1. Lumbar dextroscoliosis with superimposed degenerative changes contributing to mild-to-moderate neural foraminal narrowing from L2-L3 through L5-S1 as detailed above.  2. Prominent right degenerative facet edema at L5-S1.  3. Mild pancreatic ductal dilatation, incompletely evaluated on this exam.  Recommend correlation with laboratory values and further workup with MRCP as clinically warranted.        Electronically signed by: Ben Leonard MD  Date:                                            02/23/2023  Time:                                           15:08    EXAMINATION:  XR LUMBAR SPINE 5 VIEW WITH FLEX AND EXT     CLINICAL HISTORY:  Other intervertebral disc degeneration, lumbar region      TECHNIQUE:  AP, lateral, and oblique images are performed through the lumbar spine.     COMPARISON:  None     FINDINGS:  Lumbar vertebral body heights are maintained.     Disc space narrowing and endplate changes at every level.  Facet arthropathy most pronounced L4-5 and L5-S1.     AP alignment is anatomic.  Dextroconvex curvature lumbar spine.     Aortic atherosclerosis.     Impression:     No acute osseous abnormality seen.  Degenerative change as above.    EXAMINATION:  XR CERVICAL SPINE 5 VIEW WITH FLEX AND EXT     CLINICAL HISTORY:  Cervicalgia     TECHNIQUE:  Five views of the cervical spine plus flexion and extension views were performed.     COMPARISON:  None.     FINDINGS:  There is reversal usual cervical lordosis with forward kyphotic curvature centered at C4.  C3- 4 has 3 mm of anterolisthesis, and C4-5 has 3 mm of retrolisthesis.  C3 through T1 levels all have severe disc space narrowing.  Facet arthropathy is most prominent on the right at C2-3 and C3-4, on the left at C3-4.  Surgical clips are present in both sides of the neck, no prevertebral soft tissue swelling is seen.     Flexion and extension views demonstrate 1 mm greater anterolisthesis at C3-4 with flexion.  No fracture is seen.     Oblique views show neural foramen narrowing that is greatest at C4-5 on the right.  All the foramen on left appear narrowed, but this may be due to the technique and positioning of the x-ray.     Impression:     Extensive degenerative disc disease and spondylosis.  Alignment abnormality, as above        Electronically signed by: Jordan Frederick MD  Date:                                            03/20/2023        Electronically signed by: Elena Vanegas  Date:                                            02/08/2023  Time:                                           14:41    ASSESSMENT: 82 y.o. year old male with pain, consistent with:    Encounter Diagnoses   Name Primary?    Lumbosacral spondylosis without  myelopathy Yes    Cervicalgia          DISCUSSION: Mr. Hernandez is a very nice octogenarian who comes to us with chronic low back pain, worse on the right side. Imaging shows multilevel DDD with Modic changes around L5/S1. It also shows multilevel facet arthropathy with promient facet edema at L5/S1. He did well with LMBB and returned to discuss neck pain. Xray shows multilevel DDD and facet arthropathy.     PLAN:  Reviewed cervical imaging today  Continue with scheduled right L3-5 RFA (6/20)  Follow up after RFA to discuss neck pain and consider bilateral C3,4,5 MBB

## 2023-06-15 ENCOUNTER — TELEPHONE (OUTPATIENT)
Dept: PAIN MEDICINE | Facility: CLINIC | Age: 83
End: 2023-06-15
Payer: MEDICARE

## 2023-06-15 NOTE — TELEPHONE ENCOUNTER
----- Message from Amy Prince sent at 6/15/2023  1:49 PM CDT -----  .Type:  Patient Returning Call    Who Called: Self    Who Left Message for Patient: Corey Mcclure    Does the patient know what this is regarding?: Yes     Would the patient rather a call back or a response via [facility]? Call Back     Best Call Back Number: .[phone] (home)       Additional Information:

## 2023-06-15 NOTE — TELEPHONE ENCOUNTER
Staff spoke to patient who wanted to know which blood thinner he should stop taking before his procedure.

## 2023-06-20 ENCOUNTER — HOSPITAL ENCOUNTER (OUTPATIENT)
Facility: OTHER | Age: 83
Discharge: HOME | End: 2023-06-20
Attending: ANESTHESIOLOGY | Admitting: ANESTHESIOLOGY
Payer: MEDICARE

## 2023-06-20 VITALS
SYSTOLIC BLOOD PRESSURE: 199 MMHG | WEIGHT: 140 LBS | RESPIRATION RATE: 16 BRPM | BODY MASS INDEX: 21.97 KG/M2 | DIASTOLIC BLOOD PRESSURE: 94 MMHG | OXYGEN SATURATION: 97 % | TEMPERATURE: 97 F | HEART RATE: 78 BPM | HEIGHT: 67 IN

## 2023-06-20 DIAGNOSIS — G89.29 CHRONIC PAIN: ICD-10-CM

## 2023-06-20 DIAGNOSIS — M47.819 SPONDYLOSIS WITHOUT MYELOPATHY: ICD-10-CM

## 2023-06-20 DIAGNOSIS — M47.899 OTHER OSTEOARTHRITIS OF SPINE, UNSPECIFIED SPINAL REGION: Primary | ICD-10-CM

## 2023-06-20 PROCEDURE — 64635 DESTROY LUMB/SAC FACET JNT: CPT | Mod: RT,,, | Performed by: ANESTHESIOLOGY

## 2023-06-20 PROCEDURE — 64636 PR DESTROY L/S FACET JNT ADDL: ICD-10-PCS | Mod: RT,,, | Performed by: ANESTHESIOLOGY

## 2023-06-20 PROCEDURE — 25000003 PHARM REV CODE 250: Performed by: EMERGENCY MEDICINE

## 2023-06-20 PROCEDURE — 64635 DESTROY LUMB/SAC FACET JNT: CPT | Mod: RT | Performed by: ANESTHESIOLOGY

## 2023-06-20 PROCEDURE — 63600175 PHARM REV CODE 636 W HCPCS: Performed by: ANESTHESIOLOGY

## 2023-06-20 PROCEDURE — 25000003 PHARM REV CODE 250: Performed by: ANESTHESIOLOGY

## 2023-06-20 PROCEDURE — 64636 DESTROY L/S FACET JNT ADDL: CPT | Mod: RT,,, | Performed by: ANESTHESIOLOGY

## 2023-06-20 PROCEDURE — 64635 PR DESTROY LUMB/SAC FACET JNT: ICD-10-PCS | Mod: RT,,, | Performed by: ANESTHESIOLOGY

## 2023-06-20 PROCEDURE — 64636 DESTROY L/S FACET JNT ADDL: CPT | Mod: RT | Performed by: ANESTHESIOLOGY

## 2023-06-20 RX ORDER — FENTANYL CITRATE 50 UG/ML
INJECTION, SOLUTION INTRAMUSCULAR; INTRAVENOUS
Status: DISCONTINUED | OUTPATIENT
Start: 2023-06-20 | End: 2023-06-20 | Stop reason: HOSPADM

## 2023-06-20 RX ORDER — BUPIVACAINE HYDROCHLORIDE 5 MG/ML
INJECTION, SOLUTION EPIDURAL; INTRACAUDAL
Status: DISCONTINUED | OUTPATIENT
Start: 2023-06-20 | End: 2023-06-20 | Stop reason: HOSPADM

## 2023-06-20 RX ORDER — LIDOCAINE HYDROCHLORIDE 20 MG/ML
INJECTION, SOLUTION INFILTRATION; PERINEURAL
Status: DISCONTINUED | OUTPATIENT
Start: 2023-06-20 | End: 2023-06-20 | Stop reason: HOSPADM

## 2023-06-20 RX ORDER — DEXAMETHASONE SODIUM PHOSPHATE 10 MG/ML
INJECTION INTRAMUSCULAR; INTRAVENOUS
Status: DISCONTINUED | OUTPATIENT
Start: 2023-06-20 | End: 2023-06-20 | Stop reason: HOSPADM

## 2023-06-20 RX ORDER — MIDAZOLAM HYDROCHLORIDE 1 MG/ML
INJECTION INTRAMUSCULAR; INTRAVENOUS
Status: DISCONTINUED | OUTPATIENT
Start: 2023-06-20 | End: 2023-06-20 | Stop reason: HOSPADM

## 2023-06-20 RX ORDER — SODIUM CHLORIDE 9 MG/ML
INJECTION, SOLUTION INTRAVENOUS CONTINUOUS
Status: DISCONTINUED | OUTPATIENT
Start: 2023-06-20 | End: 2023-06-20 | Stop reason: HOSPADM

## 2023-06-20 NOTE — DISCHARGE SUMMARY
Discharge Note  Short Stay      SUMMARY     Admit Date: 6/20/2023    Attending Physician: CANDACE COOPER      Discharge Physician: CANDACE COOPER      Discharge Date: 6/20/2023 2:10 PM    Procedure(s) (LRB):  RADIOFREQUENCY ABLATION, RIGHT L3-L5 clear to hold Plavix 7 days , Pletal 3 days (Right)    Final Diagnosis: Lumbosacral spondylosis without myelopathy [M47.817]    Disposition: Home or self care    Patient Instructions:   Current Discharge Medication List        CONTINUE these medications which have NOT CHANGED    Details   albuterol (PROVENTIL/VENTOLIN HFA) 90 mcg/actuation inhaler Inhale 2 puffs every 6 (six) hours as needed into the lungs for Wheezing      alprazolam (XANAX) 0.25 MG tablet Take 0.25 mg by mouth 2 (two) times daily.  Refills: 3      amoxicillin-clavulanate 875-125mg (AUGMENTIN) 875-125 mg per tablet Take 1 tablet in the morning and 1 tablet before bedtime by mouth. Do all this for 10 days.      apixaban (ELIQUIS ORAL) Take by mouth.      APRISO 0.375 gram Cp24 TAKE 4 CAPSULES BY MOUTH DAILY  Qty: 120 capsule, Refills: 3    Comments: This prescription was filled on 9/13/2017. Any refills authorized will be placed on file.      aspirin (ECOTRIN) 81 MG EC tablet Take 81 mg by mouth once daily.      atenolol (TENORMIN) 50 MG tablet Take 50 mg by mouth once daily.      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      clopidogreL (PLAVIX) 75 mg tablet Take 75 mg by mouth.      fenofibrate micronized (LOFIBRA) 134 MG Cap Take 134 mg by mouth daily with breakfast.      HYDROcodone-acetaminophen (NORCO) 5-325 mg per tablet Take 1 tablet by mouth every 6 (six) hours as needed  for Pain.  Qty: 10 tablet, Refills: 0    Comments: Quantity prescribed more than 7 day supply? No  Associated Diagnoses: Squamous cell carcinoma of scalp      mesalamine (CANASA) 1000 MG Supp Place 1 suppository (1,000 mg total) rectally nightly.  Qty: 90 suppository, Refills: 3      mupirocin (BACTROBAN) 2 % ointment Apply to affected area 3 times daily  Qty: 22 g, Refills: 1      rosuvastatin (CRESTOR) 10 MG tablet Take 10 mg by mouth once daily.      tramadol (ULTRAM) 50 mg tablet Take 50 mg by mouth Daily.  Refills: 0      zolpidem (AMBIEN) 10 mg Tab Take 10 mg by mouth every evening.  Refills: 2                 Discharge Diagnosis: Lumbosacral spondylosis without myelopathy [M47.817]  Condition on Discharge: Stable with no complications to procedure   Diet on Discharge: Same as before.  Activity: as per instruction sheet.  Discharge to: Home with a responsible adult.  Follow up: 2-4 weeks       Please call my office or pager at [phone] if experienced any weakness or loss of sensation, fever > 101.5, pain uncontrolled with oral medications, persistent nausea/vomiting/or diarrhea, redness or drainage from the incisions, or any other worrisome concerns. If physician on call was not reached or could not communicate with our office for any reason please go to the nearest emergency department

## 2023-06-20 NOTE — OP NOTE
Therapeutic Lumbar Medial Branch Radiofrequency Ablation under Fluoroscopy     The procedure, risks, benefits, and options were discussed with the patient. There are no contraindications to the procedure. The patent expressed understanding and agreed to the procedure. Informed written consent was obtained prior to the start of the procedure and can be found in the patient's chart.        PATIENT NAME: Mr. Piter Hernandez   MRN: [id number]     DATE OF PROCEDURE: 06/20/2023     PROCEDURE:  Right L3, L4, and L5 Lumbar Radiofrequency Ablation under Fluoroscopy    PRE-OP DIAGNOSIS: Lumbosacral spondylosis without myelopathy [M47.817] Lumbar spondylosis [M47.816]    POST-OP DIAGNOSIS: Same    PHYSICIAN: Megha Magana MD    ASSISTANTS: Sal Grace MD Resident       MEDICATIONS INJECTED:  Preservative-free Decadron 10mg with 9cc of Bupivicaine 0.25%    LOCAL ANESTHETIC INJECTED:   Xylocaine 2%    SEDATION: Versed 1mg and Fentanyl 50mcg                                                                                                                                                                                     Conscious sedation ordered by M.D. Patient re-evaluation prior to administration of conscious sedation. No changes noted in patient's status from initial evaluation. The patient's vital signs were monitored by RN and patient remained hemodynamically stable throughout the procedure.    Event Time In   Sedation Start 1351   Sedation End 1411       ESTIMATED BLOOD LOSS:  None    COMPLICATIONS:  None     INTERVAL HISTORY: Patient has clinical and imaging findings suggestive of facet mediated pain. Patients has completed 2 previous diagnostic medial branch blocks at specified levels with at least 80% relief for the expected duration of the local anesthetic utilized.    TECHNIQUE: Time-out was performed to identify the patient and procedure to be performed. With the patient laying in a prone position, the surgical  area was prepped and draped in the usual sterile fashion using ChloraPrep and fenestrated drape. The levels were determined under fluoroscopic guidance. Skin anesthesia was achieved by injecting Lidocaine 2% over the injection sites. A 18 gauge 10mm curved active tip needle was introduced to the anatomic local of the medial branch at each of the above levels using AP, lateral and/or contralateral oblique fluoroscopic imaging. Then sensory and motor testing was performed to confirm that the needle tips were in the correct location. After negative aspiration for blood or CSF was confirmed, 1 mL of the lidocaine 2% listed above was injected slowly at each site. This was followed by thermal lesioning at 80 degrees celsius for 90 seconds. That was followed by slowly injecting 1 mL of the medication mixture listed above at each site. The needles were removed and bleeding was nil. A sterile dressing was applied. No specimens collected. The patient tolerated the procedure well and did not have any procedure related motor deficit at the conclusion of the procedure.    The patient was monitored after the procedure in the recovery area. They were given post-procedure and discharge instructions to follow at home. The patient was discharged in a stable condition.    CANDACE COOPER MD    I reviewed and edited the fellow's note. I conducted my own interview and physical examination. I agree with the findings. I was present and supervising all critical portions of the procedure.

## 2023-06-20 NOTE — DISCHARGE INSTRUCTIONS

## 2023-11-13 ENCOUNTER — OFFICE VISIT (OUTPATIENT)
Dept: URGENT CARE | Facility: CLINIC | Age: 83
End: 2023-11-13
Payer: MEDICARE

## 2023-11-13 VITALS
HEART RATE: 67 BPM | HEIGHT: 67 IN | DIASTOLIC BLOOD PRESSURE: 71 MMHG | SYSTOLIC BLOOD PRESSURE: 131 MMHG | RESPIRATION RATE: 18 BRPM | BODY MASS INDEX: 21.97 KG/M2 | OXYGEN SATURATION: 96 % | TEMPERATURE: 98 F | WEIGHT: 140 LBS

## 2023-11-13 DIAGNOSIS — R05.3 CHRONIC COUGH: ICD-10-CM

## 2023-11-13 DIAGNOSIS — J06.9 VIRAL URI WITH COUGH: Primary | ICD-10-CM

## 2023-11-13 PROCEDURE — 99213 PR OFFICE/OUTPT VISIT, EST, LEVL III, 20-29 MIN: ICD-10-PCS | Mod: S$GLB,,,

## 2023-11-13 PROCEDURE — 99213 OFFICE O/P EST LOW 20 MIN: CPT | Mod: S$GLB,,,

## 2023-11-13 RX ORDER — ATORVASTATIN CALCIUM 10 MG/1
20 TABLET, FILM COATED ORAL NIGHTLY
COMMUNITY
Start: 2023-10-23

## 2023-11-13 RX ORDER — METOPROLOL SUCCINATE 50 MG/1
50 TABLET, EXTENDED RELEASE ORAL
COMMUNITY

## 2023-11-13 RX ORDER — BENZONATATE 200 MG/1
200 CAPSULE ORAL 3 TIMES DAILY PRN
Qty: 30 CAPSULE | Refills: 0 | Status: SHIPPED | OUTPATIENT
Start: 2023-11-13 | End: 2023-11-23

## 2023-11-13 NOTE — PATIENT INSTRUCTIONS
- Rest.    - Drink plenty of fluids. Increasing your fluid intake will help loosen up mucous.  - Viral upper respiratory infections typically run their course in 10-14 days.     - You can take Delsym to help with cough. This is safe for patients with high blood pressure.      - You can use Flonase (fluticasone) nasal spray as directed for sinus congestion and postnasal drip. This is a steroid nasal spray that works locally over time to decrease the inflammation in your nose/sinuses and help with allergic symptoms. This is not an quick- relief spray like afrin, but it works well if used daily.  Discontinue if you develop nose bleed  - Use nasal saline prior to Flonase.  - Use Ocean Spray Nasal Saline 1-3 puffs each nostril every 2-3 hours then blow out onto tissue. This is to irrigate the nasal passage way to clear the sinus openings. Use until sinus problem resolved.    - A Neti Pot with sterile saline can help break up nasal congestion and give relief.      - Chloraseptic throat spray can help numb the throat.     - Warm salt water gargles can help with sore throat.  - Warm tea with honey can help with sore throat and cough. Honey is a natural cough suppressant.      - Acetaminophen (tylenol) or Ibuprofen (advil,motrin) as directed as needed for fever/pain. Avoid tylenol if you have a history of liver disease. Do not take ibuprofen if you have a history of GI bleeding, kidney disease, or if you take blood thinners.   - Ibuprofen dosing for adults: 400 mg by mouth every 4-6 hours as needed. Max: 2400 mg/day; Info: use lowest effective dose, shortest effective treatment duration; give w/ food if GI upset occurs.  - Tylenol dosing for adults: [By mouth route, immediate-release form] Dose: 325-1000 mg by mouth every 4-6h as needed; Max: 1 g/4h and 4 g/day from all sources. [By mouth route, extended-release form] Dose: 650-1300 mg Extended Release by mouth every 8h as needed; Max: 4 g/day from all sources.     - You  must understand that you have received an Urgent Care treatment only and that you may be released before all of your medical problems are known or treated.   - You, the patient, will arrange for follow up care as instructed.   - If your condition worsens or fails to improve we recommend that you receive another evaluation at the ER immediately or contact your PCP to discuss your concerns or return here.   - Follow up with your PCP or specialty clinic as directed in the next 1-2 weeks if not improved or as needed.  You can call [phone] to schedule an appointment with the appropriate provider.    If your symptoms do not improve or worsen, go to the emergency room immediately.

## 2023-11-13 NOTE — PROGRESS NOTES
"Subjective:      Patient ID: Piter Hernandez is a 83 y.o. male.    Vitals:  height is 5' 7" (1.702 m) and weight is 63.5 kg (140 lb). His oral temperature is 98.1 °F (36.7 °C). His blood pressure is 131/71 and his pulse is 67. His respiration is 18 and oxygen saturation is 96%.     Chief Complaint: Cough    Patient presents today for cough x 6 weeks. Based on chart review he was diagnosed with pneumonia a month ago and treated with levoquin. He then had repeat x ray done that no longer showed any signs of pneumonia. States symptoms got better, but then today he felt bad again. He states that he is still having a dry cough. He is having some nasal congestion. Denies sore throat or ear pain. Been taking coricidin for symptoms with relief.     Cough  This is a new problem. The current episode started more than 1 month ago (6 weeks ago). The problem has been gradually worsening. The problem occurs every few minutes. The cough is Productive of sputum. Associated symptoms include myalgias, nasal congestion, postnasal drip, rhinorrhea, shortness of breath and wheezing. Pertinent negatives include no chest pain, chills, ear congestion, ear pain, fever, headaches, heartburn, hemoptysis, rash, sore throat, sweats or weight loss. Treatments tried: 2 Z-paks, coricidin, OTC cough suppresant. The treatment provided no relief.       Constitution: Negative for chills and fever.   HENT:  Positive for postnasal drip. Negative for ear pain and sore throat.    Cardiovascular:  Negative for chest pain.   Respiratory:  Positive for cough, shortness of breath and wheezing. Negative for chest tightness, sputum production and bloody sputum.    Gastrointestinal:  Negative for heartburn.   Musculoskeletal:  Positive for muscle ache.   Skin:  Negative for rash.   Neurological:  Negative for headaches, disorientation and altered mental status.   Psychiatric/Behavioral:  Negative for altered mental status and disorientation.       Objective: "     Physical Exam   Constitutional: He is oriented to person, place, and time. He appears well-developed. He is cooperative.  Non-toxic appearance. He does not appear ill. No distress.      Comments:Patient sits comfortably in exam chair. Answers questions in complete sentences. Does not show any signs of distress or discoloration.        HENT:   Head: Normocephalic and atraumatic.   Ears:   Right Ear: Hearing, tympanic membrane, external ear and ear canal normal. impacted cerumen  Left Ear: Hearing, tympanic membrane, external ear and ear canal normal. impacted cerumen  Nose: Congestion present. No mucosal edema, rhinorrhea or nasal deformity. No epistaxis. Right sinus exhibits no maxillary sinus tenderness and no frontal sinus tenderness. Left sinus exhibits no maxillary sinus tenderness and no frontal sinus tenderness.   Mouth/Throat: Uvula is midline, oropharynx is clear and moist and mucous membranes are normal. No trismus in the jaw. Normal dentition. No uvula swelling. No oropharyngeal exudate, posterior oropharyngeal edema or posterior oropharyngeal erythema.   Eyes: Conjunctivae and lids are normal. No scleral icterus.   Neck: Trachea normal and phonation normal. Neck supple. No edema present. No erythema present. No neck rigidity present.   Cardiovascular: Normal rate, regular rhythm, normal heart sounds and normal pulses.   Pulmonary/Chest: Effort normal and breath sounds normal. No stridor. No respiratory distress. He has no decreased breath sounds. He has no wheezes. He has no rhonchi. He has no rales.   Abdominal: Normal appearance.   Musculoskeletal: Normal range of motion.         General: No deformity. Normal range of motion.   Lymphadenopathy:     He has no cervical adenopathy.        Right cervical: No superficial cervical, no deep cervical and no posterior cervical adenopathy present.       Left cervical: No superficial cervical, no deep cervical and no posterior cervical adenopathy present.    Neurological: He is alert and oriented to person, place, and time. He exhibits normal muscle tone. Coordination normal.   Skin: Skin is warm, dry, intact, not diaphoretic and not pale.   Psychiatric: His speech is normal and behavior is normal. Judgment and thought content normal.   Nursing note and vitals reviewed.      Assessment:     1. Viral URI with cough    2. Chronic cough        Plan:       Viral URI with cough  -     benzonatate (TESSALON) 200 MG capsule; Take 1 capsule (200 mg total) by mouth 3 (three) times daily as needed for Cough.  Dispense: 30 capsule; Refill: 0    Chronic cough  -     Ambulatory referral/consult to ENT                  Patient Instructions   - Rest.    - Drink plenty of fluids. Increasing your fluid intake will help loosen up mucous.  - Viral upper respiratory infections typically run their course in 10-14 days.     - You can take Delsym to help with cough. This is safe for patients with high blood pressure.      - You can use Flonase (fluticasone) nasal spray as directed for sinus congestion and postnasal drip. This is a steroid nasal spray that works locally over time to decrease the inflammation in your nose/sinuses and help with allergic symptoms. This is not an quick- relief spray like afrin, but it works well if used daily.  Discontinue if you develop nose bleed  - Use nasal saline prior to Flonase.  - Use Ocean Spray Nasal Saline 1-3 puffs each nostril every 2-3 hours then blow out onto tissue. This is to irrigate the nasal passage way to clear the sinus openings. Use until sinus problem resolved.    - A Neti Pot with sterile saline can help break up nasal congestion and give relief.      - Chloraseptic throat spray can help numb the throat.     - Warm salt water gargles can help with sore throat.  - Warm tea with honey can help with sore throat and cough. Honey is a natural cough suppressant.      - Acetaminophen (tylenol) or Ibuprofen (advil,motrin) as directed as needed  for fever/pain. Avoid tylenol if you have a history of liver disease. Do not take ibuprofen if you have a history of GI bleeding, kidney disease, or if you take blood thinners.   - Ibuprofen dosing for adults: 400 mg by mouth every 4-6 hours as needed. Max: 2400 mg/day; Info: use lowest effective dose, shortest effective treatment duration; give w/ food if GI upset occurs.  - Tylenol dosing for adults: [By mouth route, immediate-release form] Dose: 325-1000 mg by mouth every 4-6h as needed; Max: 1 g/4h and 4 g/day from all sources. [By mouth route, extended-release form] Dose: 650-1300 mg Extended Release by mouth every 8h as needed; Max: 4 g/day from all sources.     - You must understand that you have received an Urgent Care treatment only and that you may be released before all of your medical problems are known or treated.   - You, the patient, will arrange for follow up care as instructed.   - If your condition worsens or fails to improve we recommend that you receive another evaluation at the ER immediately or contact your PCP to discuss your concerns or return here.   - Follow up with your PCP or specialty clinic as directed in the next 1-2 weeks if not improved or as needed.  You can call [phone] to schedule an appointment with the appropriate provider.    If your symptoms do not improve or worsen, go to the emergency room immediately.

## 2023-11-14 NOTE — INTERVAL H&P NOTE
The patient has been examined and the H&P has been reviewed:    I concur with the findings and no changes have occurred since H&P was written.    Procedure risks, benefits and alternative options discussed and understood by patient/family.           No assistance needed

## 2025-05-07 ENCOUNTER — OFFICE VISIT (OUTPATIENT)
Dept: OTOLARYNGOLOGY | Facility: CLINIC | Age: 85
End: 2025-05-07
Payer: MEDICARE

## 2025-05-07 DIAGNOSIS — R26.89 IMBALANCE: ICD-10-CM

## 2025-05-07 DIAGNOSIS — H81.11 BPPV (BENIGN PAROXYSMAL POSITIONAL VERTIGO), RIGHT: Primary | ICD-10-CM

## 2025-05-07 PROCEDURE — 3288F FALL RISK ASSESSMENT DOCD: CPT | Mod: CPTII,S$GLB,, | Performed by: NURSE PRACTITIONER

## 2025-05-07 PROCEDURE — 1101F PT FALLS ASSESS-DOCD LE1/YR: CPT | Mod: CPTII,S$GLB,, | Performed by: NURSE PRACTITIONER

## 2025-05-07 PROCEDURE — 99204 OFFICE O/P NEW MOD 45 MIN: CPT | Mod: S$GLB,,, | Performed by: NURSE PRACTITIONER

## 2025-05-07 PROCEDURE — 95992 CANALITH REPOSITIONING PROC: CPT | Mod: S$GLB,,, | Performed by: PHYSICIAN ASSISTANT

## 2025-05-07 PROCEDURE — 1159F MED LIST DOCD IN RCRD: CPT | Mod: CPTII,S$GLB,, | Performed by: NURSE PRACTITIONER

## 2025-05-07 PROCEDURE — 99999 PR PBB SHADOW E&M-EST. PATIENT-LVL III: CPT | Mod: PBBFAC,,, | Performed by: NURSE PRACTITIONER

## 2025-05-07 PROCEDURE — 1126F AMNT PAIN NOTED NONE PRSNT: CPT | Mod: CPTII,S$GLB,, | Performed by: NURSE PRACTITIONER

## 2025-05-07 NOTE — PROGRESS NOTES
"Subjective:   Piter Hernandez is a 84 y.o. male who was self-referred to me in consultation for dizziness. 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 He comes for a second opinion for 9-10 months of imbalance. Describes as off balance sensation, not lightheadedness or true room spinning sensation. Episodes only occur with walking and last the full duration of moving. Often worse when turning quickly. He notes that the imbalance has been present constantly/ "99% of the time". The sensation will resolve once he stops walking. Denies any weakness in his lower extremities or any orthopedic issues. Does note that sometimes he feels like he is "walking in jello". He feels like he could be pulled to either side. Recently stopped physical therapy for balance and coordination, with minimal improvement in symptoms. Denies any ear symptoms during or outside of episodes. He denies any recent head trauma or new medications. States he had an audiogram at an outside facility and it was fine.     Past Medical History  He has a past medical history of Anticoagulant long-term use, Arthritis, Cancer, Hypertension, Squamous cell carcinoma of scalp, Squamous cell carcinoma of skin, and Thyroid disease.    Past Surgical History  He has a past surgical history that includes Colonoscopy (N/A, 09/12/2016); Injection of anesthetic agent around nerve (Right, 03/28/2023); Injection of anesthetic agent around nerve (Right, 04/11/2023); Radiofrequency ablation (Right, 05/09/2023); Radiofrequency ablation (Right, 06/20/2023); and Watchman device placement.    Family History  His family history is not on file.    Social History  He reports that he quit smoking about 39 years ago. His smoking use included cigarettes. He has never used smokeless tobacco. He reports current alcohol use. He reports " that he does not use drugs.    Allergies  He is allergic to zolpidem.    Medications  He has a current medication list which includes the following prescription(s): albuterol, alprazolam, amoxicillin-clavulanate 875-125mg, apixaban, apriso, aspirin, atenolol, atorvastatin, azathioprine, azithromycin, chlorzoxazone, clopidogrel, fenofibrate micronized, hydrocodone-acetaminophen, mesalamine, metoprolol succinate, mupirocin, rosuvastatin, sacubitril-valsartan, tramadol, and zolpidem.    Review of Systems     Constitutional: Negative for chills and fever.      HENT: Negative for ear discharge, ear infection, ear pain, hearing loss and ringing in the ears.      Neurological: Positive for dizziness. Negative for headaches.          Objective:     Constitutional:   He appears well-developed and well-nourished. He appears alert.  Non-toxic appearance. He does not have a sickly appearance. He does not appear ill. No distress. Normal speech.      Head:  Normocephalic and atraumatic. Head is without abrasion and without contusion.     Ears:    Right Ear: No drainage, swelling or tenderness. Tympanic membrane is not perforated, not erythematous, not retracted and not bulging. No middle ear effusion.   Left Ear: No drainage, swelling or tenderness. Tympanic membrane is not perforated, not erythematous, not retracted and not bulging.  No middle ear effusion.   Ears:      Psychiatric:   He has a normal mood and affect. His speech is normal and behavior is normal.     Neurological:   He is alert.     [place]-Hallpike Left: Negative for torsional and up-beating nystagmus    [place]-Hallpike Right: Positive for torsional and up-beating nystagmus    Tandem Gait: CNT due to imbalance    Romberg: Positive    Procedure  None    Audiogram  Declined.   Assessment:     1. BPPV (benign paroxysmal positional vertigo), right    2. Imbalance      Plan:   Imbalance  Discussed various factors related to dizziness and imbalance including: age-related  changes, cardiovascular, orthopedic/muscular, vestibular or neurologic. I believe his dizziness is likely multifactorial and I recommended Vestibular physical therapy for balance/ coordination training. Also recommended seeing neurology for further evaluation, patient scheduled for an appointment in a few weeks.    BPPV (benign paroxysmal positional vertigo), right  I had a long discussion with the patient regarding their condition and the further workup and management options.  Findings are consistent with BPPV of the right ear. We discussed the relevant anatomy and pathology of BPPV.  Will refer to PT for treatment and management.

## (undated) DEVICE — DRESSING LEUKOPLAST FLEX 1X3IN